# Patient Record
Sex: FEMALE | Race: WHITE | NOT HISPANIC OR LATINO | Employment: OTHER | URBAN - METROPOLITAN AREA
[De-identification: names, ages, dates, MRNs, and addresses within clinical notes are randomized per-mention and may not be internally consistent; named-entity substitution may affect disease eponyms.]

---

## 2023-08-02 ENCOUNTER — PRE-ADMISSION TESTING (OUTPATIENT)
Dept: PREADMISSION TESTING | Facility: HOSPITAL | Age: 73
End: 2023-08-02
Payer: MEDICARE

## 2023-08-02 VITALS — BODY MASS INDEX: 36.32 KG/M2 | WEIGHT: 212.74 LBS | HEIGHT: 64 IN

## 2023-08-02 LAB
DEPRECATED RDW RBC AUTO: 48 FL (ref 37–54)
ERYTHROCYTE [DISTWIDTH] IN BLOOD BY AUTOMATED COUNT: 12.9 % (ref 12.3–15.4)
HCT VFR BLD AUTO: 33.9 % (ref 34–46.6)
HGB BLD-MCNC: 10.6 G/DL (ref 12–15.9)
MCH RBC QN AUTO: 31.5 PG (ref 26.6–33)
MCHC RBC AUTO-ENTMCNC: 31.3 G/DL (ref 31.5–35.7)
MCV RBC AUTO: 100.9 FL (ref 79–97)
PLATELET # BLD AUTO: 202 10*3/MM3 (ref 140–450)
PMV BLD AUTO: 10.6 FL (ref 6–12)
POTASSIUM SERPL-SCNC: 4.7 MMOL/L (ref 3.5–5.2)
RBC # BLD AUTO: 3.36 10*6/MM3 (ref 3.77–5.28)
WBC NRBC COR # BLD: 4.9 10*3/MM3 (ref 3.4–10.8)

## 2023-08-02 PROCEDURE — 87081 CULTURE SCREEN ONLY: CPT

## 2023-08-02 PROCEDURE — 36415 COLL VENOUS BLD VENIPUNCTURE: CPT

## 2023-08-02 PROCEDURE — 84132 ASSAY OF SERUM POTASSIUM: CPT

## 2023-08-02 PROCEDURE — 85027 COMPLETE CBC AUTOMATED: CPT

## 2023-08-02 RX ORDER — MAGNESIUM OXIDE 400 MG/1
400 TABLET ORAL 2 TIMES DAILY
COMMUNITY

## 2023-08-02 RX ORDER — HYDROXYZINE HYDROCHLORIDE 25 MG/1
1 TABLET, FILM COATED ORAL 4 TIMES DAILY
COMMUNITY
Start: 2023-04-24

## 2023-08-02 RX ORDER — GABAPENTIN 300 MG/1
2 CAPSULE ORAL 3 TIMES DAILY
COMMUNITY
Start: 2023-02-14

## 2023-08-02 RX ORDER — GLIPIZIDE 10 MG/1
1 TABLET, FILM COATED, EXTENDED RELEASE ORAL 2 TIMES DAILY
COMMUNITY
Start: 2023-05-05

## 2023-08-02 RX ORDER — CARVEDILOL 25 MG/1
25 TABLET ORAL 2 TIMES DAILY WITH MEALS
COMMUNITY
Start: 2023-05-31

## 2023-08-02 RX ORDER — EZETIMIBE 10 MG/1
1 TABLET ORAL DAILY
COMMUNITY
Start: 2023-06-16

## 2023-08-02 RX ORDER — LISINOPRIL 40 MG/1
TABLET ORAL
COMMUNITY

## 2023-08-02 RX ORDER — PIOGLITAZONEHYDROCHLORIDE 45 MG/1
1 TABLET ORAL EVERY MORNING
COMMUNITY
Start: 2023-05-24

## 2023-08-02 NOTE — PAT
An arrival time for procedure was not provided during PAT visit. If patient had any questions or concerns about their arrival time, they were instructed to contact their surgeon/physician.  Additionally, if the patient referred to an arrival time that was acquired from their my chart account, patient was encouraged to verify that time with their surgeon/physician. Arrival times are NOT provided in Pre Admission Testing Department.    Patient viewed general PAT education video as instructed in their preoperative information received from their surgeon.  Patient stated the general PAT education video was viewed in its entirety and survey completed.  Copies of PAT general education handouts (Incentive Spirometry, Meds to Beds Program, Patient Belongings, Pre-op skin preparation instructions, Blood Glucose testing, Visitor policy, Surgery FAQ, Code H) distributed to patient if not printed. Education related to the PAT pass and skin preparation for surgery (if applicable) completed in PAT as a reinforcement to PAT education video. Patient instructed to return PAT pass provided today as well as completed skin preparation sheet (if applicable) on the day of procedure.     Additionally if patient had not viewed video yet but intended to view it at home or in our waiting area, then referred them to the handout with QR code/link provided during PAT visit.  Instructed patient to complete survey after viewing the video in its entirety.  Encouraged patient/family to read PAT general education handouts thoroughly and notify PAT staff with any questions or concerns. Patient verbalized understanding of all information and priority content.    Patient denies any current skin issues.     Patient to apply Chlorhexadine wipes  to surgical area (as instructed) the night before procedure and the AM of procedure. Wipes provided.    Patient instructed to drink 20 ounces of Gatorade and it needs to be completed 1 hour (for Main OR patients)  or 2 hours (scheduled  section & [facility] patients) before given arrival time for procedure (NO RED Gatorade)    Patient verbalized understanding.    Per Anesthesia Request, patient instructed not to take their ACE/ARB medications on the AM of surgery.    Post-Surgery Information Instruction Sheet given to patient during Pre-Admission Testing Visit with verbal instructions to patient to return with PAT PASS on the day of surgery. Additionally, encouraged patient to review the information provided.    PT REPORTED RECENT UTI AND ABX (KEFLEX) WITH LAST DOSE 23. PT STATED UTI SYMPTOMS HAVE RESOLVED. REPORTED ABOVE TO  FOR DR. FIGUEROA. DR. FIGUEROA'S OFFICE TO FOLLOW-UP WITH PT IF NEEDED.     EKG FROM 23 ON CHART. PT DENIES CP/SOA.

## 2023-08-04 LAB — MRSA SPEC QL CULT: NORMAL

## 2023-08-09 ENCOUNTER — ANESTHESIA (OUTPATIENT)
Dept: PERIOP | Facility: HOSPITAL | Age: 73
DRG: 454 | End: 2023-08-09
Payer: MEDICARE

## 2023-08-09 ENCOUNTER — ANESTHESIA EVENT (OUTPATIENT)
Dept: PERIOP | Facility: HOSPITAL | Age: 73
DRG: 454 | End: 2023-08-09
Payer: MEDICARE

## 2023-08-09 ENCOUNTER — APPOINTMENT (OUTPATIENT)
Dept: GENERAL RADIOLOGY | Facility: HOSPITAL | Age: 73
DRG: 454 | End: 2023-08-09
Payer: MEDICARE

## 2023-08-09 ENCOUNTER — HOSPITAL ENCOUNTER (INPATIENT)
Facility: HOSPITAL | Age: 73
LOS: 5 days | Discharge: NURSING, REHAB OR LONG-TERM CARE | DRG: 454 | End: 2023-08-14
Attending: ORTHOPAEDIC SURGERY | Admitting: ORTHOPAEDIC SURGERY
Payer: MEDICARE

## 2023-08-09 DIAGNOSIS — Z98.1 S/P LUMBAR FUSION: Primary | ICD-10-CM

## 2023-08-09 PROBLEM — E11.9 DIABETES: Status: ACTIVE | Noted: 2023-08-09

## 2023-08-09 PROBLEM — M54.9 BACK PAIN: Status: ACTIVE | Noted: 2023-08-09

## 2023-08-09 PROBLEM — I10 HYPERTENSION: Status: ACTIVE | Noted: 2023-08-09

## 2023-08-09 LAB
GLUCOSE BLDC GLUCOMTR-MCNC: 154 MG/DL (ref 70–130)
GLUCOSE BLDC GLUCOMTR-MCNC: 218 MG/DL (ref 70–130)
GLUCOSE BLDC GLUCOMTR-MCNC: 236 MG/DL (ref 70–130)
GLUCOSE BLDC GLUCOMTR-MCNC: 239 MG/DL (ref 70–130)
GLUCOSE BLDC GLUCOMTR-MCNC: 318 MG/DL (ref 70–130)

## 2023-08-09 PROCEDURE — C1713 ANCHOR/SCREW BN/BN,TIS/BN: HCPCS | Performed by: ORTHOPAEDIC SURGERY

## 2023-08-09 PROCEDURE — 25010000002 CEFAZOLIN IN DEXTROSE 2-4 GM/100ML-% SOLUTION: Performed by: ORTHOPAEDIC SURGERY

## 2023-08-09 PROCEDURE — 25010000002 CEFAZOLIN IN DEXTROSE 2000 MG/ 100 ML SOLUTION: Performed by: ORTHOPAEDIC SURGERY

## 2023-08-09 PROCEDURE — 0SG1071 FUSION OF 2 OR MORE LUMBAR VERTEBRAL JOINTS WITH AUTOLOGOUS TISSUE SUBSTITUTE, POSTERIOR APPROACH, POSTERIOR COLUMN, OPEN APPROACH: ICD-10-PCS | Performed by: ORTHOPAEDIC SURGERY

## 2023-08-09 PROCEDURE — 25010000002 DEXAMETHASONE PER 1 MG: Performed by: ANESTHESIOLOGY

## 2023-08-09 PROCEDURE — 25010000002 FENTANYL CITRATE (PF) 50 MCG/ML SOLUTION

## 2023-08-09 PROCEDURE — 82948 REAGENT STRIP/BLOOD GLUCOSE: CPT

## 2023-08-09 PROCEDURE — 25010000002 PHENYLEPHRINE 10 MG/ML SOLUTION 1 ML VIAL: Performed by: ANESTHESIOLOGY

## 2023-08-09 PROCEDURE — 25010000002 PROPOFOL 10 MG/ML EMULSION: Performed by: ANESTHESIOLOGY

## 2023-08-09 PROCEDURE — 25010000002 SUGAMMADEX 200 MG/2ML SOLUTION: Performed by: NURSE ANESTHETIST, CERTIFIED REGISTERED

## 2023-08-09 PROCEDURE — 63710000001 INSULIN LISPRO (HUMAN) PER 5 UNITS: Performed by: NURSE PRACTITIONER

## 2023-08-09 PROCEDURE — 76000 FLUOROSCOPY <1 HR PHYS/QHP: CPT

## 2023-08-09 PROCEDURE — 63710000001 INSULIN LISPRO (HUMAN) PER 5 UNITS

## 2023-08-09 PROCEDURE — 0ST20ZZ RESECTION OF LUMBAR VERTEBRAL DISC, OPEN APPROACH: ICD-10-PCS | Performed by: ORTHOPAEDIC SURGERY

## 2023-08-09 PROCEDURE — 25010000002 FENTANYL CITRATE (PF) 100 MCG/2ML SOLUTION: Performed by: ANESTHESIOLOGY

## 2023-08-09 PROCEDURE — 25010000002 ONDANSETRON PER 1 MG: Performed by: ANESTHESIOLOGY

## 2023-08-09 PROCEDURE — 0SG10AJ FUSION OF 2 OR MORE LUMBAR VERTEBRAL JOINTS WITH INTERBODY FUSION DEVICE, POSTERIOR APPROACH, ANTERIOR COLUMN, OPEN APPROACH: ICD-10-PCS | Performed by: ORTHOPAEDIC SURGERY

## 2023-08-09 PROCEDURE — 25010000002 SODIUM CHLORIDE 0.9 % WITH KCL 20 MEQ 20-0.9 MEQ/L-% SOLUTION: Performed by: ORTHOPAEDIC SURGERY

## 2023-08-09 DEVICE — ALLOGRFT BONE VIVIGEN CELLUAR MATRX FORMABLE 10CC: Type: IMPLANTABLE DEVICE | Site: SPINE LUMBAR | Status: FUNCTIONAL

## 2023-08-09 DEVICE — IMPLANTABLE DEVICE: Type: IMPLANTABLE DEVICE | Site: SPINE LUMBAR | Status: FUNCTIONAL

## 2023-08-09 DEVICE — SPACR OPAL 10X12X28MM: Type: IMPLANTABLE DEVICE | Site: SPINE LUMBAR | Status: FUNCTIONAL

## 2023-08-09 DEVICE — IMPLANTABLE DEVICE
Type: IMPLANTABLE DEVICE | Site: SPINE LUMBAR | Status: FUNCTIONAL
Brand: SURGIFOAM® ABSORBABLE GELATIN SPONGE, U.S.P.

## 2023-08-09 DEVICE — DELIVERY SYSTEM CANNULA
Type: IMPLANTABLE DEVICE | Site: SPINE LUMBAR | Status: FUNCTIONAL
Brand: VIVIGEN MIS DELIVERY SYSTEM

## 2023-08-09 DEVICE — SCRW CORT VIPER FEN PA TI 6X40MM: Type: IMPLANTABLE DEVICE | Site: SPINE LUMBAR | Status: FUNCTIONAL

## 2023-08-09 DEVICE — FLOSEAL WITH RECOTHROM - 10ML.
Type: IMPLANTABLE DEVICE | Site: SPINE LUMBAR | Status: FUNCTIONAL
Brand: FLOSEAL HEMOSTATIC MATRIX

## 2023-08-09 DEVICE — SCRW VIPER INNR ST: Type: IMPLANTABLE DEVICE | Site: SPINE LUMBAR | Status: FUNCTIONAL

## 2023-08-09 RX ORDER — OXYCODONE AND ACETAMINOPHEN 10; 325 MG/1; MG/1
TABLET ORAL
Status: ACTIVE
Start: 2023-08-09 | End: 2023-08-10

## 2023-08-09 RX ORDER — EPHEDRINE SULFATE 50 MG/ML
INJECTION INTRAVENOUS AS NEEDED
Status: DISCONTINUED | OUTPATIENT
Start: 2023-08-09 | End: 2023-08-09 | Stop reason: SURG

## 2023-08-09 RX ORDER — ONDANSETRON 2 MG/ML
4 INJECTION INTRAMUSCULAR; INTRAVENOUS ONCE AS NEEDED
Status: DISCONTINUED | OUTPATIENT
Start: 2023-08-09 | End: 2023-08-09 | Stop reason: HOSPADM

## 2023-08-09 RX ORDER — SODIUM CHLORIDE 9 MG/ML
40 INJECTION, SOLUTION INTRAVENOUS AS NEEDED
Status: DISCONTINUED | OUTPATIENT
Start: 2023-08-09 | End: 2023-08-09 | Stop reason: HOSPADM

## 2023-08-09 RX ORDER — FAMOTIDINE 20 MG/1
20 TABLET, FILM COATED ORAL ONCE
Status: COMPLETED | OUTPATIENT
Start: 2023-08-09 | End: 2023-08-09

## 2023-08-09 RX ORDER — METHOCARBAMOL 500 MG/1
1000 TABLET, FILM COATED ORAL 4 TIMES DAILY PRN
Status: DISCONTINUED | OUTPATIENT
Start: 2023-08-09 | End: 2023-08-14 | Stop reason: HOSPADM

## 2023-08-09 RX ORDER — SODIUM CHLORIDE 0.9 % (FLUSH) 0.9 %
3-10 SYRINGE (ML) INJECTION AS NEEDED
Status: DISCONTINUED | OUTPATIENT
Start: 2023-08-09 | End: 2023-08-14 | Stop reason: HOSPADM

## 2023-08-09 RX ORDER — EPHEDRINE SULFATE 50 MG/ML
5 INJECTION, SOLUTION INTRAVENOUS ONCE AS NEEDED
Status: DISCONTINUED | OUTPATIENT
Start: 2023-08-09 | End: 2023-08-09 | Stop reason: HOSPADM

## 2023-08-09 RX ORDER — CEFAZOLIN SODIUM 2 G/100ML
2000 INJECTION, SOLUTION INTRAVENOUS ONCE
Status: DISCONTINUED | OUTPATIENT
Start: 2023-08-09 | End: 2023-08-09 | Stop reason: SDUPTHER

## 2023-08-09 RX ORDER — INSULIN LISPRO 100 [IU]/ML
INJECTION, SOLUTION INTRAVENOUS; SUBCUTANEOUS
Status: COMPLETED
Start: 2023-08-09 | End: 2023-08-09

## 2023-08-09 RX ORDER — NICOTINE POLACRILEX 4 MG
15 LOZENGE BUCCAL
Status: DISCONTINUED | OUTPATIENT
Start: 2023-08-09 | End: 2023-08-14 | Stop reason: HOSPADM

## 2023-08-09 RX ORDER — SODIUM CHLORIDE 9 MG/ML
40 INJECTION, SOLUTION INTRAVENOUS AS NEEDED
Status: DISCONTINUED | OUTPATIENT
Start: 2023-08-09 | End: 2023-08-14 | Stop reason: HOSPADM

## 2023-08-09 RX ORDER — ROCURONIUM BROMIDE 10 MG/ML
INJECTION, SOLUTION INTRAVENOUS AS NEEDED
Status: DISCONTINUED | OUTPATIENT
Start: 2023-08-09 | End: 2023-08-09 | Stop reason: SURG

## 2023-08-09 RX ORDER — FAMOTIDINE 10 MG/ML
20 INJECTION, SOLUTION INTRAVENOUS EVERY 12 HOURS SCHEDULED
Status: DISCONTINUED | OUTPATIENT
Start: 2023-08-09 | End: 2023-08-14

## 2023-08-09 RX ORDER — NALOXONE HCL 0.4 MG/ML
0.4 VIAL (ML) INJECTION
Status: DISCONTINUED | OUTPATIENT
Start: 2023-08-09 | End: 2023-08-14 | Stop reason: HOSPADM

## 2023-08-09 RX ORDER — SODIUM CHLORIDE, SODIUM LACTATE, POTASSIUM CHLORIDE, CALCIUM CHLORIDE 600; 310; 30; 20 MG/100ML; MG/100ML; MG/100ML; MG/100ML
100 INJECTION, SOLUTION INTRAVENOUS CONTINUOUS
Status: DISCONTINUED | OUTPATIENT
Start: 2023-08-09 | End: 2023-08-14 | Stop reason: HOSPADM

## 2023-08-09 RX ORDER — FENTANYL CITRATE 50 UG/ML
INJECTION, SOLUTION INTRAMUSCULAR; INTRAVENOUS AS NEEDED
Status: DISCONTINUED | OUTPATIENT
Start: 2023-08-09 | End: 2023-08-09 | Stop reason: SURG

## 2023-08-09 RX ORDER — IBUPROFEN 600 MG/1
1 TABLET ORAL
Status: DISCONTINUED | OUTPATIENT
Start: 2023-08-09 | End: 2023-08-14 | Stop reason: HOSPADM

## 2023-08-09 RX ORDER — DEXTROSE MONOHYDRATE 25 G/50ML
25 INJECTION, SOLUTION INTRAVENOUS
Status: DISCONTINUED | OUTPATIENT
Start: 2023-08-09 | End: 2023-08-14 | Stop reason: HOSPADM

## 2023-08-09 RX ORDER — INSULIN LISPRO 100 [IU]/ML
INJECTION, SOLUTION INTRAVENOUS; SUBCUTANEOUS
Status: ACTIVE
Start: 2023-08-09 | End: 2023-08-10

## 2023-08-09 RX ORDER — HYDROMORPHONE HYDROCHLORIDE 1 MG/ML
0.5 INJECTION, SOLUTION INTRAMUSCULAR; INTRAVENOUS; SUBCUTANEOUS
Status: DISCONTINUED | OUTPATIENT
Start: 2023-08-09 | End: 2023-08-09 | Stop reason: HOSPADM

## 2023-08-09 RX ORDER — HYDROCODONE BITARTRATE AND ACETAMINOPHEN 7.5; 325 MG/1; MG/1
1 TABLET ORAL EVERY 4 HOURS PRN
Status: DISCONTINUED | OUTPATIENT
Start: 2023-08-09 | End: 2023-08-14

## 2023-08-09 RX ORDER — ACETAMINOPHEN 500 MG
1000 TABLET ORAL ONCE
Status: COMPLETED | OUTPATIENT
Start: 2023-08-09 | End: 2023-08-09

## 2023-08-09 RX ORDER — CEFAZOLIN SODIUM 2 G/100ML
2000 INJECTION, SOLUTION INTRAVENOUS ONCE
Status: COMPLETED | OUTPATIENT
Start: 2023-08-09 | End: 2023-08-09

## 2023-08-09 RX ORDER — INSULIN LISPRO 100 [IU]/ML
2-7 INJECTION, SOLUTION INTRAVENOUS; SUBCUTANEOUS
Status: DISCONTINUED | OUTPATIENT
Start: 2023-08-09 | End: 2023-08-14 | Stop reason: HOSPADM

## 2023-08-09 RX ORDER — HYDROXYZINE HYDROCHLORIDE 25 MG/1
TABLET, FILM COATED ORAL
Status: DISCONTINUED
Start: 2023-08-09 | End: 2023-08-10 | Stop reason: WASHOUT

## 2023-08-09 RX ORDER — HYDROXYZINE HYDROCHLORIDE 25 MG/1
25 TABLET, FILM COATED ORAL 4 TIMES DAILY
Status: DISCONTINUED | OUTPATIENT
Start: 2023-08-09 | End: 2023-08-14 | Stop reason: HOSPADM

## 2023-08-09 RX ORDER — FAMOTIDINE 20 MG/1
20 TABLET, FILM COATED ORAL EVERY 12 HOURS SCHEDULED
Status: DISCONTINUED | OUTPATIENT
Start: 2023-08-09 | End: 2023-08-14 | Stop reason: HOSPADM

## 2023-08-09 RX ORDER — BUPIVACAINE HYDROCHLORIDE AND EPINEPHRINE 2.5; 5 MG/ML; UG/ML
INJECTION, SOLUTION INFILTRATION; PERINEURAL AS NEEDED
Status: DISCONTINUED | OUTPATIENT
Start: 2023-08-09 | End: 2023-08-09 | Stop reason: HOSPADM

## 2023-08-09 RX ORDER — ACETAMINOPHEN 325 MG/1
650 TABLET ORAL EVERY 4 HOURS PRN
Status: DISCONTINUED | OUTPATIENT
Start: 2023-08-09 | End: 2023-08-14 | Stop reason: HOSPADM

## 2023-08-09 RX ORDER — SODIUM CHLORIDE, SODIUM LACTATE, POTASSIUM CHLORIDE, CALCIUM CHLORIDE 600; 310; 30; 20 MG/100ML; MG/100ML; MG/100ML; MG/100ML
9 INJECTION, SOLUTION INTRAVENOUS CONTINUOUS
Status: DISCONTINUED | OUTPATIENT
Start: 2023-08-09 | End: 2023-08-14 | Stop reason: HOSPADM

## 2023-08-09 RX ORDER — CEFAZOLIN SODIUM 2 G/100ML
2000 INJECTION, SOLUTION INTRAVENOUS EVERY 8 HOURS
Status: COMPLETED | OUTPATIENT
Start: 2023-08-09 | End: 2023-08-10

## 2023-08-09 RX ORDER — PROPOFOL 10 MG/ML
VIAL (ML) INTRAVENOUS AS NEEDED
Status: DISCONTINUED | OUTPATIENT
Start: 2023-08-09 | End: 2023-08-09 | Stop reason: SURG

## 2023-08-09 RX ORDER — FENTANYL CITRATE 50 UG/ML
50 INJECTION, SOLUTION INTRAMUSCULAR; INTRAVENOUS
Status: DISCONTINUED | OUTPATIENT
Start: 2023-08-09 | End: 2023-08-09 | Stop reason: HOSPADM

## 2023-08-09 RX ORDER — FAMOTIDINE 10 MG/ML
20 INJECTION, SOLUTION INTRAVENOUS ONCE
Status: DISCONTINUED | OUTPATIENT
Start: 2023-08-09 | End: 2023-08-09 | Stop reason: HOSPADM

## 2023-08-09 RX ORDER — LIDOCAINE HYDROCHLORIDE 10 MG/ML
INJECTION, SOLUTION EPIDURAL; INFILTRATION; INTRACAUDAL; PERINEURAL AS NEEDED
Status: DISCONTINUED | OUTPATIENT
Start: 2023-08-09 | End: 2023-08-09 | Stop reason: SURG

## 2023-08-09 RX ORDER — BISACODYL 10 MG
10 SUPPOSITORY, RECTAL RECTAL DAILY PRN
Status: DISCONTINUED | OUTPATIENT
Start: 2023-08-09 | End: 2023-08-14 | Stop reason: HOSPADM

## 2023-08-09 RX ORDER — SODIUM CHLORIDE 0.9 % (FLUSH) 0.9 %
10 SYRINGE (ML) INJECTION AS NEEDED
Status: DISCONTINUED | OUTPATIENT
Start: 2023-08-09 | End: 2023-08-09 | Stop reason: HOSPADM

## 2023-08-09 RX ORDER — LABETALOL HYDROCHLORIDE 5 MG/ML
10 INJECTION, SOLUTION INTRAVENOUS EVERY 4 HOURS PRN
Status: DISCONTINUED | OUTPATIENT
Start: 2023-08-09 | End: 2023-08-14 | Stop reason: HOSPADM

## 2023-08-09 RX ORDER — PHENYLEPHRINE HCL IN 0.9% NACL 1 MG/10 ML
SYRINGE (ML) INTRAVENOUS AS NEEDED
Status: DISCONTINUED | OUTPATIENT
Start: 2023-08-09 | End: 2023-08-09 | Stop reason: SURG

## 2023-08-09 RX ORDER — DEXAMETHASONE SODIUM PHOSPHATE 4 MG/ML
INJECTION, SOLUTION INTRA-ARTICULAR; INTRALESIONAL; INTRAMUSCULAR; INTRAVENOUS; SOFT TISSUE AS NEEDED
Status: DISCONTINUED | OUTPATIENT
Start: 2023-08-09 | End: 2023-08-09 | Stop reason: SURG

## 2023-08-09 RX ORDER — MAGNESIUM HYDROXIDE 1200 MG/15ML
LIQUID ORAL AS NEEDED
Status: DISCONTINUED | OUTPATIENT
Start: 2023-08-09 | End: 2023-08-09 | Stop reason: HOSPADM

## 2023-08-09 RX ORDER — ONDANSETRON 2 MG/ML
INJECTION INTRAMUSCULAR; INTRAVENOUS AS NEEDED
Status: DISCONTINUED | OUTPATIENT
Start: 2023-08-09 | End: 2023-08-09 | Stop reason: SURG

## 2023-08-09 RX ORDER — ONDANSETRON 2 MG/ML
4 INJECTION INTRAMUSCULAR; INTRAVENOUS EVERY 6 HOURS PRN
Status: DISCONTINUED | OUTPATIENT
Start: 2023-08-09 | End: 2023-08-14 | Stop reason: HOSPADM

## 2023-08-09 RX ORDER — DOCUSATE SODIUM 100 MG/1
100 CAPSULE, LIQUID FILLED ORAL 2 TIMES DAILY PRN
Status: DISCONTINUED | OUTPATIENT
Start: 2023-08-09 | End: 2023-08-14 | Stop reason: HOSPADM

## 2023-08-09 RX ORDER — PREGABALIN 75 MG/1
75 CAPSULE ORAL ONCE
Status: COMPLETED | OUTPATIENT
Start: 2023-08-09 | End: 2023-08-09

## 2023-08-09 RX ORDER — INSULIN LISPRO 100 [IU]/ML
4 INJECTION, SOLUTION INTRAVENOUS; SUBCUTANEOUS ONCE
Status: COMPLETED | OUTPATIENT
Start: 2023-08-09 | End: 2023-08-09

## 2023-08-09 RX ORDER — OXYCODONE HCL 10 MG/1
10 TABLET, FILM COATED, EXTENDED RELEASE ORAL ONCE
Status: COMPLETED | OUTPATIENT
Start: 2023-08-09 | End: 2023-08-09

## 2023-08-09 RX ORDER — FAMOTIDINE 20 MG/1
TABLET, FILM COATED ORAL
Status: ACTIVE
Start: 2023-08-09 | End: 2023-08-10

## 2023-08-09 RX ORDER — BISACODYL 5 MG/1
5 TABLET, DELAYED RELEASE ORAL DAILY PRN
Status: DISCONTINUED | OUTPATIENT
Start: 2023-08-09 | End: 2023-08-14 | Stop reason: HOSPADM

## 2023-08-09 RX ORDER — NALOXONE HCL 0.4 MG/ML
0.4 VIAL (ML) INJECTION AS NEEDED
Status: DISCONTINUED | OUTPATIENT
Start: 2023-08-09 | End: 2023-08-09 | Stop reason: HOSPADM

## 2023-08-09 RX ORDER — SODIUM CHLORIDE AND POTASSIUM CHLORIDE 150; 900 MG/100ML; MG/100ML
100 INJECTION, SOLUTION INTRAVENOUS CONTINUOUS
Status: DISCONTINUED | OUTPATIENT
Start: 2023-08-09 | End: 2023-08-10

## 2023-08-09 RX ORDER — LISINOPRIL 40 MG/1
40 TABLET ORAL
Status: DISCONTINUED | OUTPATIENT
Start: 2023-08-09 | End: 2023-08-11

## 2023-08-09 RX ORDER — LIDOCAINE HYDROCHLORIDE 10 MG/ML
0.5 INJECTION, SOLUTION EPIDURAL; INFILTRATION; INTRACAUDAL; PERINEURAL ONCE AS NEEDED
Status: COMPLETED | OUTPATIENT
Start: 2023-08-09 | End: 2023-08-09

## 2023-08-09 RX ORDER — FENTANYL CITRATE 50 UG/ML
INJECTION, SOLUTION INTRAMUSCULAR; INTRAVENOUS
Status: COMPLETED
Start: 2023-08-09 | End: 2023-08-09

## 2023-08-09 RX ORDER — MORPHINE SULFATE 1 MG/ML
1 INJECTION, SOLUTION EPIDURAL; INTRATHECAL; INTRAVENOUS EVERY 4 HOURS PRN
Status: DISCONTINUED | OUTPATIENT
Start: 2023-08-09 | End: 2023-08-14 | Stop reason: HOSPADM

## 2023-08-09 RX ORDER — OXYCODONE AND ACETAMINOPHEN 10; 325 MG/1; MG/1
1 TABLET ORAL EVERY 4 HOURS PRN
Status: DISCONTINUED | OUTPATIENT
Start: 2023-08-09 | End: 2023-08-14 | Stop reason: HOSPADM

## 2023-08-09 RX ORDER — CARVEDILOL 12.5 MG/1
25 TABLET ORAL 2 TIMES DAILY WITH MEALS
Status: DISCONTINUED | OUTPATIENT
Start: 2023-08-09 | End: 2023-08-11

## 2023-08-09 RX ORDER — SODIUM CHLORIDE 0.9 % (FLUSH) 0.9 %
10 SYRINGE (ML) INJECTION EVERY 12 HOURS SCHEDULED
Status: DISCONTINUED | OUTPATIENT
Start: 2023-08-09 | End: 2023-08-09 | Stop reason: HOSPADM

## 2023-08-09 RX ORDER — SODIUM CHLORIDE 0.9 % (FLUSH) 0.9 %
3 SYRINGE (ML) INJECTION EVERY 12 HOURS SCHEDULED
Status: DISCONTINUED | OUTPATIENT
Start: 2023-08-09 | End: 2023-08-14 | Stop reason: HOSPADM

## 2023-08-09 RX ADMIN — OXYCODONE HYDROCHLORIDE 10 MG: 10 TABLET, FILM COATED, EXTENDED RELEASE ORAL at 07:04

## 2023-08-09 RX ADMIN — SODIUM CHLORIDE, POTASSIUM CHLORIDE, SODIUM LACTATE AND CALCIUM CHLORIDE 9 ML/HR: 600; 310; 30; 20 INJECTION, SOLUTION INTRAVENOUS at 07:04

## 2023-08-09 RX ADMIN — Medication 100 MCG: at 07:56

## 2023-08-09 RX ADMIN — PHENYLEPHRINE HYDROCHLORIDE 0.25 MG: 10 INJECTION INTRAVENOUS at 09:04

## 2023-08-09 RX ADMIN — INSULIN LISPRO 4 UNITS: 100 INJECTION, SOLUTION INTRAVENOUS; SUBCUTANEOUS at 15:04

## 2023-08-09 RX ADMIN — EPHEDRINE SULFATE 5 MG: 50 INJECTION, SOLUTION INTRAVENOUS at 08:05

## 2023-08-09 RX ADMIN — PROPOFOL 25 MCG/KG/MIN: 10 INJECTION, EMULSION INTRAVENOUS at 08:00

## 2023-08-09 RX ADMIN — LIDOCAINE HYDROCHLORIDE 0.5 ML: 10 INJECTION, SOLUTION EPIDURAL; INFILTRATION; INTRACAUDAL; PERINEURAL at 07:02

## 2023-08-09 RX ADMIN — Medication 100 MCG: at 08:47

## 2023-08-09 RX ADMIN — ROCURONIUM BROMIDE 10 MG: 10 SOLUTION INTRAVENOUS at 10:15

## 2023-08-09 RX ADMIN — LIDOCAINE HYDROCHLORIDE 50 MG: 10 INJECTION, SOLUTION EPIDURAL; INFILTRATION; INTRACAUDAL; PERINEURAL at 07:40

## 2023-08-09 RX ADMIN — ROCURONIUM BROMIDE 50 MG: 10 SOLUTION INTRAVENOUS at 07:41

## 2023-08-09 RX ADMIN — CEFAZOLIN SODIUM 2000 MG: 2 INJECTION, SOLUTION INTRAVENOUS at 11:44

## 2023-08-09 RX ADMIN — FENTANYL CITRATE 50 MCG: 50 INJECTION, SOLUTION INTRAMUSCULAR; INTRAVENOUS at 13:03

## 2023-08-09 RX ADMIN — PROPOFOL 200 MG: 10 INJECTION, EMULSION INTRAVENOUS at 07:40

## 2023-08-09 RX ADMIN — HYDROCODONE BITARTRATE AND ACETAMINOPHEN 1 TABLET: 7.5; 325 TABLET ORAL at 18:15

## 2023-08-09 RX ADMIN — INSULIN LISPRO 3 UNITS: 100 INJECTION, SOLUTION INTRAVENOUS; SUBCUTANEOUS at 21:20

## 2023-08-09 RX ADMIN — Medication 100 MCG: at 08:05

## 2023-08-09 RX ADMIN — EPHEDRINE SULFATE 5 MG: 50 INJECTION, SOLUTION INTRAVENOUS at 08:01

## 2023-08-09 RX ADMIN — PREGABALIN 75 MG: 75 CAPSULE ORAL at 07:02

## 2023-08-09 RX ADMIN — FENTANYL CITRATE 100 MCG: 50 INJECTION, SOLUTION INTRAMUSCULAR; INTRAVENOUS at 07:40

## 2023-08-09 RX ADMIN — CEFAZOLIN SODIUM 2000 MG: 2 INJECTION, SOLUTION INTRAVENOUS at 07:47

## 2023-08-09 RX ADMIN — FAMOTIDINE 20 MG: 20 TABLET ORAL at 07:06

## 2023-08-09 RX ADMIN — INSULIN LISPRO 3 UNITS: 100 INJECTION, SOLUTION INTRAVENOUS; SUBCUTANEOUS at 18:14

## 2023-08-09 RX ADMIN — ACETAMINOPHEN 1000 MG: 500 TABLET ORAL at 07:02

## 2023-08-09 RX ADMIN — Medication 100 MCG: at 08:56

## 2023-08-09 RX ADMIN — FAMOTIDINE 20 MG: 20 TABLET ORAL at 21:20

## 2023-08-09 RX ADMIN — OXYCODONE HYDROCHLORIDE AND ACETAMINOPHEN 1 TABLET: 10; 325 TABLET ORAL at 21:30

## 2023-08-09 RX ADMIN — EPHEDRINE SULFATE 5 MG: 50 INJECTION, SOLUTION INTRAVENOUS at 07:59

## 2023-08-09 RX ADMIN — CEFAZOLIN SODIUM 2000 MG: 2 INJECTION, SOLUTION INTRAVENOUS at 21:18

## 2023-08-09 RX ADMIN — POTASSIUM CHLORIDE AND SODIUM CHLORIDE 100 ML/HR: 900; 150 INJECTION, SOLUTION INTRAVENOUS at 17:05

## 2023-08-09 RX ADMIN — SUGAMMADEX 200 MG: 100 INJECTION, SOLUTION INTRAVENOUS at 12:10

## 2023-08-09 RX ADMIN — DEXAMETHASONE SODIUM PHOSPHATE 4 MG: 4 INJECTION, SOLUTION INTRAMUSCULAR; INTRAVENOUS at 07:45

## 2023-08-09 RX ADMIN — EPHEDRINE SULFATE 5 MG: 50 INJECTION, SOLUTION INTRAVENOUS at 08:47

## 2023-08-09 RX ADMIN — ONDANSETRON 4 MG: 2 INJECTION INTRAMUSCULAR; INTRAVENOUS at 11:42

## 2023-08-09 RX ADMIN — HYDROXYZINE HYDROCHLORIDE 25 MG: 25 TABLET, FILM COATED ORAL at 18:15

## 2023-08-09 RX ADMIN — POTASSIUM CHLORIDE AND SODIUM CHLORIDE 100 ML/HR: 900; 150 INJECTION, SOLUTION INTRAVENOUS at 17:57

## 2023-08-09 RX ADMIN — ROCURONIUM BROMIDE 20 MG: 10 SOLUTION INTRAVENOUS at 08:40

## 2023-08-09 NOTE — ANESTHESIA PROCEDURE NOTES
Airway  Urgency: elective    Date/Time: 8/9/2023 7:43 AM  Airway not difficult    General Information and Staff    Patient location during procedure: OR  SRNA: Benita Guerra SRNA  Indications and Patient Condition  Indications for airway management: airway protection    Preoxygenated: yes  MILS maintained throughout  Mask difficulty assessment: 2 - vent by mask + OA or adjuvant +/- NMBA    Final Airway Details  Final airway type: endotracheal airway      Successful airway: ETT  Cuffed: yes   Successful intubation technique: direct laryngoscopy  Facilitating devices/methods: intubating stylet  Endotracheal tube insertion site: oral  Blade: Kerns  Blade size: 2  ETT size (mm): 7.0  Cormack-Lehane Classification: grade I - full view of glottis  Placement verified by: chest auscultation and capnometry   Cuff volume (mL): 8  Measured from: lips  ETT/EBT  to lips (cm): 22  Number of attempts at approach: 1  Assessment: lips, teeth, and gum same as pre-op and atraumatic intubation    Additional Comments  Negative epigastric sounds, Breath sound equal bilaterally with symmetric chest rise and fall

## 2023-08-09 NOTE — H&P
[employer], [employer]  Physician     H&P      Signed     Date of Service: 08/09/23 0000  Creation Time: 07/26/23 1229     Associated Documents  Scan on 8/9/2023 by [employer], [employer]: H&P, NEW [facility], 07/19/2023         Last signed by: [employer], [employer] at 07/26/23 1229      [facility] Pre-op    Full history and physical note from office is up to date.     There were no vitals taken for this visit.  Patient denies allergy to contrast dye or latex  IMM:  Influenza: No  Pneumococcal: No  Tetanus: Up-to-date  Lungs: Clear to auscultation bilaterally to the bases  Cardiovascular: S1-S2 without rubs murmurs or gallops  Abdomen: Soft, nontender, bowel sounds present throughout.    LAB Results:  Lab Results   Component Value Date    WBC 4.90 08/02/2023    HGB 10.6 (L) 08/02/2023    HCT 33.9 (L) 08/02/2023    .9 (H) 08/02/2023     08/02/2023    K 4.7 08/02/2023       Cancer Staging (if applicable)  Cancer Patient: __ yes __no __unknown__N/A; If yes, clinical stage T:__ N:__M:__, stage group or __N/A  Impression: Left lower extremity radiculopathy  Low back pain  Obesity  Impression: Diabetes mellitus  Hypertension  Hyperlipidemia  Plan: Lumbar decompression and fusion  ALETA Lyon   08/09/23   7:02 AM EDT

## 2023-08-09 NOTE — ANESTHESIA POSTPROCEDURE EVALUATION
Patient: Luna Mark    Procedure Summary       Date: 08/09/23 Room / Location:  [facility]  [facility]    Anesthesia Start: 0737 Anesthesia Stop: 1226    Procedure: l3-4, l4-5 laminectomy spinal fusion (Spine Lumbar) Diagnosis:     Surgeons: Darío Pickering MD Provider: Ricky Blackman MD    Anesthesia Type: general ASA Status: 3            Anesthesia Type: general    Vitals  Vitals Value Taken Time   BP     Temp     Pulse 74 08/09/23 1224   Resp     SpO2 100 % 08/09/23 1224   Vitals shown include unvalidated device data.        Post Anesthesia Care and Evaluation    Patient location during evaluation: PACU  Patient participation: complete - patient participated  Level of consciousness: sleepy but conscious  Pain score: 0  Pain management: adequate    Airway patency: patent  Anesthetic complications: No anesthetic complications  PONV Status: none  Cardiovascular status: hemodynamically stable and acceptable  Respiratory status: nonlabored ventilation, acceptable and nasal cannula  Hydration status: acceptable

## 2023-08-09 NOTE — OP NOTE
PREOPERATIVE DIAGNOSES:   L3-L4 and L4-L5 lumbar spinal stenosis.   Degenerative spondylolisthesis.     POSTOPERATIVE DIAGNOSES:  L3-L4 and L4-L5 lumbar spinal stenosis.   Degenerative spondylolisthesis.     PROCEDURES PERFORMED:   Decompressive laminectomy, medial facetectomy, and foraminotomy L3-L4 and L4-L5 to decompress neural elements.   Segmental instrumentation L3-L4-L5 with DePuy transpedicular fixation.   L3-L4 and L4-L5 transforaminal lumbar interbody fusion with interbody fusion cage and bone graft.   Posterolateral fusion L3-L4-L5 with bone graft.   Harvesting bone graft locally from spinous processes and lamina.   Use of ViviGen allograft.     SURGEON: Darío Pickering MD     ASSISTANT: Salome Jordan PA-C  (Ms. Jordan's assistance was required for patient positioning, surgical approach, neural decompression, instrumentation and wound closure.)     ANESTHESIA: General.     ESTIMATED BLOOD LOSS: 800 mL.     DESCRIPTION OF PROCEDURE: The patient was given a preoperative intravenous dose of Ancef. She was given a general anesthetic. She was placed in the prone position on the Solo table. The back was prepped and draped sterilely.     A midline incision was used. The fascia was split and paraspinal musculature elevated. I identified L3-L4 and L4-L5 by C-arm.    Instrumentation was performed first. Under C-arm guidance I placed pedicle screws bilaterally at L3, L4, and L5. Screws obtained good bony purchase. They appeared in good position under C-arm. Two rods contoured into lordosis were placed on the screws and the appropriate set screws applied.     Neural decompression was performed. I started at L4-L5. There was a severe central stenosis. Midline laminectomy was performed using Leksell rongeurs and Kerrison rongeurs. There was a severe central stenosis at L4-L5. Flavectomy was performed. Bilateral medial facetectomies were performed to decompress the lateral recesses. Foraminotomies were performed. The  neural elements at L4-L5 appeared decompressed.     I moved cephalad to L3-L4. A midline laminectomy was performed of L3. Again there was a severe central stenosis. Ligamentum flavum was excised.  Bilateral medial facetectomies were performed. Foraminotomies were performed. The neural elements at L3-L4 appeared decompressed. At this point the dural sac was widely expanded and noncompressed from L3-L4 down to L4-L5.     Transforaminal lumbar interbody fusion was then performed. I did this first at L4-L5. This was done through the left side. An annular window was created. A complete diskectomy was performed using pituitaries, curets and endplate jose. The disc was very degenerated. The cage used at L4-L5 was a DePuy/Synthes interbody fusion cage. The size used was 12 mm in height and 28 in length. Prior to placing this cage I packed bone graft in the anterior disc space. I then packed the cage with bone graft. The cage was then impacted tangentially across the disc space and locked into place well. It appeared in good position on C-arm.     I moved cephalad to L3-L4. An interbody fusion was also performed at this level. An annular window was created on the left side between the exiting nerve root above and the traversing nerve root medially. A complete diskectomy was performed. The appropriate size cage at L3-L4 was 12 mm in height also. Prior to placing the cage I packed bone graft in the anterior disc space. The interbody fusion cage was packed with bone graft. It was then impacted tangentially across the disc space. Looked in good position on C-arm. At this point I applied compression across the screws and tightened the set screws to lock the cages in place. The cages and the construct appeared solid.     Posterolateral fusion was performed. The transverse processes of L3, L4 and L5 were decorticated. I then packed bone graft in the posterolateral gutters L3-L4-L5 on the right and left sides. Prior to bone  grafting I did a final copious irrigation with both Irrisept and then normal saline.     It should be noted bone graft was prepared from locally harvested bone. The bone from the laminectomy and facetectomy was morcellized. This bone was then mixed with ViviGen allograft. The above material was used for fusion bone.     A final torque tightening was done of all the screws. Final C-arm images showed all hardware in good position. Exposed dura was covered with thrombin-soaked Gelfoam. A deep Hemovac drain was placed.     The wound was closed in layers using Vicryl and skin staples. A sterile dressing was applied. The patient was awakened and transported to recovery room in stable condition.

## 2023-08-09 NOTE — ANESTHESIA PREPROCEDURE EVALUATION
Anesthesia Evaluation     Patient summary reviewed and Nursing notes reviewed   NPO Solid Status: > 8 hours  NPO Liquid Status: > 2 hours           Airway   Mallampati: II  TM distance: >3 FB  Neck ROM: full  No difficulty expected  Dental    (+) edentulous and upper dentures    Pulmonary    (-) asthma, shortness of breath, recent URI, sleep apnea, not a smoker, no home oxygen  Cardiovascular     ECG reviewed    (+) hypertensionhyperlipidemia  (-) past MI, CAD, dysrhythmias, angina, cardiac stents    ROS comment: ECG NSR Poss LAE    Neuro/Psych  (-) seizures, CVA  GI/Hepatic/Renal/Endo    (+) renal disease (in past now OK), diabetes mellitus (A1c UNK) type 2  (-) liver disease, no thyroid disorder    Musculoskeletal     Abdominal    Substance History      OB/GYN          Other   blood dyscrasia anemia,     ROS/Med Hx Other: HCT 33  creat UNK     K normal                   Anesthesia Plan    ASA 3     general     (Propofol infusion as part of an anti PONV technique  )  intravenous induction     Anesthetic plan, risks, benefits, and alternatives have been provided, discussed and informed consent has been obtained with: patient.    Plan discussed with CRNA.      CODE STATUS:

## 2023-08-09 NOTE — PLAN OF CARE
Problem: Adult Inpatient Plan of Care  Goal: Plan of Care Review  Outcome: Ongoing, Progressing  Flowsheets (Taken 8/9/2023 1826)  Progress: improving  Plan of Care Reviewed With: patient  Goal: Patient-Specific Goal (Individualized)  Outcome: Ongoing, Progressing  Goal: Absence of Hospital-Acquired Illness or Injury  Outcome: Ongoing, Progressing  Goal: Optimal Comfort and Wellbeing  Outcome: Ongoing, Progressing  Intervention: Monitor Pain and Promote Comfort  Recent Flowsheet Documentation  Taken 8/9/2023 1750 by Lesvia Dunaway RN  Pain Management Interventions: pain management plan reviewed with patient/caregiver  Intervention: Provide Person-Centered Care  Recent Flowsheet Documentation  Taken 8/9/2023 1750 by Lesvia Dunaway RN  Trust Relationship/Rapport:   care explained   choices provided   emotional support provided   empathic listening provided   questions answered   questions encouraged   reassurance provided   thoughts/feelings acknowledged  Goal: Readiness for Transition of Care  Outcome: Ongoing, Progressing  Intervention: Mutually Develop Transition Plan  Recent Flowsheet Documentation  Taken 8/9/2023 1822 by Lesvia Dunaway RN  Transportation Anticipated: family or friend will provide  Transportation Concerns: none  Patient/Family Anticipated Services at Transition:      durable medical equipment  Patient/Family Anticipates Transition to: home with help/services   Goal Outcome Evaluation:  Plan of Care Reviewed With: patient        Progress: improving

## 2023-08-09 NOTE — H&P
Patient Name: Luna Mark  MRN: [id number]  : 1950  DOS: 2023    Attending: Darío Pickering MD    Primary Care Provider: Provider, No Known      Chief complaint: Back pain    Subjective   Patient is a pleasant 72 y.o. female presented for scheduled surgery by Dr. Pickering.  She underwent lumbar fusion under general anesthesia.  She tolerated surgery well and was admitted for further medical management.  She is to have back pain for over 3 years.  The pain radiates down bilateral lower extremities.  She denies saddle anesthesia.    When seen in PACU she is doing well.  Her pain is well-controlled.  She denies nausea, shortness of breath or chest pain.  No history of DVT or PE.    Allergies:  No Known Allergies    Meds:  Medications Prior to Admission   Medication Sig Dispense Refill Last Dose    carvedilol (COREG) 25 MG tablet Take 1 tablet by mouth 2 (Two) Times a Day With Meals.   2023 at 1700    ezetimibe (ZETIA) 10 MG tablet Take 1 tablet by mouth Daily.   2023    gabapentin (NEURONTIN) 300 MG capsule Take 2 capsules by mouth 3 (Three) Times a Day.   2023    glipizide (GLUCOTROL XL) 10 MG 24 hr tablet Take 1 tablet by mouth 2 (Two) Times a Day.   2023    hydrOXYzine (ATARAX) 25 MG tablet Take 1 tablet by mouth 4 (Four) Times a Day.   2023    lisinopril (PRINIVIL,ZESTRIL) 40 MG tablet Take 1 tablet twice a day by oral route.   2023    magnesium oxide (MAG-OX) 400 MG tablet Take 1 tablet by mouth 2 (Two) Times a Day.   2023    metFORMIN (GLUCOPHAGE) 500 MG tablet TAKE 1 TABLETS TWICE DAILY WITH MEALS - NEED MD APPOINTMENT FOR REFILLS   2023    pioglitazone (ACTOS) 45 MG tablet Take 1 tablet by mouth Every Morning.   2023    vitamin D3 125 MCG (5000 UT) capsule capsule Take 1 capsule by mouth Daily.   2023         History:   Past Medical History:   Diagnosis Date    Diabetes mellitus     H/O skin graft     Hypertension     UTI (urinary tract infection)   "    Past Surgical History:   Procedure Laterality Date    COLONOSCOPY      SKIN GRAFT      BILATERAL HANDS AND ABDOMEN     History reviewed. No pertinent family history.  Social History     Tobacco Use    Smoking status: Never    Smokeless tobacco: Never   Vaping Use    Vaping Use: Never used   Substance Use Topics    Alcohol use: Never    Drug use: Never   She lives alone and has 1 child.  She is retired nurse.    Review of Systems  Pertinent items are noted in HPI, all other systems reviewed and negative    Vital Signs  /54 (BP Location: Right arm, Patient Position: Lying)   Pulse 70   Temp 97.5 øF (36.4 øC) (Oral)   Resp 14   Ht 162.6 cm (64\")   Wt 96.5 kg (212 lb 11.9 oz)   SpO2 96%   BMI 36.52 kg/mý     Physical Exam:    General Appearance:    Alert, cooperative, in no acute distress   Head:    Normocephalic, without obvious abnormality, atraumatic   Eyes:            Lids and lashes normal, conjunctivae and sclerae normal, no   icterus, no pallor, corneas clear,    Ears:    Ears appear intact with no abnormalities noted   Throat:   No oral lesions, no thrush, oral mucosa moist   Back: Surgical dressing CDI.  Hemovac present   Lungs:     Clear to auscultation,respirations regular, even and unlabored    Heart:    Regular rhythm and normal rate, normal S1 and S2, no murmur, no gallop   Abdomen:     Normal bowel sounds, no masses, no organomegaly, soft non-tender, non-distended, no guarding, no rebound  tenderness   Genitalia:    Deferred.  Doran-yellow UOP   Extremities:   Moves all extremities well, no edema, no cyanosis, no  redness   Pulses:   Pulses palpable and equal bilaterally   Skin:   No bleeding, bruising or rash   Neurologic:   Cranial nerves 2 - 12 grossly intact. Flexion and dorsiflexion intact bilateral feet.        I reviewed the patient's new clinical results.      Latest Reference Range & Units 08/02/23 15:34   Potassium 3.5 - 5.2 mmol/L 4.7   WBC 3.40 - 10.80 10*3/mm3 4.90   RBC 3.77 " - 5.28 10*6/mm3 3.36 (L)   Hemoglobin 12.0 - 15.9 g/dL 10.6 (L)   Hematocrit 34.0 - 46.6 % 33.9 (L)   Platelets 140 - 450 10*3/mm3 202   RDW 12.3 - 15.4 % 12.9   MCV 79.0 - 97.0 fL 100.9 (H)   MCH 26.6 - 33.0 pg 31.5   MCHC 31.5 - 35.7 g/dL 31.3 (L)   MPV 6.0 - 12.0 fL 10.6   (L): Data is abnormally low  (H): Data is abnormally high    Assessment and Plan:     S/P lumbar fusion    Back pain    Hypertension    Diabetes      Plan  1. PT/OT-ambulate  2. Pain control-prns   3. IS-encourage  4. DVT proph- Mechs  5. Bowel regimen  6. Resume home medications as appropriate  7. Monitor post-op labs  8. DC planning for home    HTN  - Continue home coreg  - Monitor BP   - Holding parameters for BP meds  - Labetalol PRN for SBP>170    DM  - hgb A1c in AM  - Hold OHA for now  - Accu-Chek AC and HS with low dose SSI      Uma Shields, ROBYN  08/09/23  18:10 EDT

## 2023-08-10 PROBLEM — D62 ACUTE BLOOD LOSS ANEMIA: Status: ACTIVE | Noted: 2023-08-10

## 2023-08-10 LAB
ANION GAP SERPL CALCULATED.3IONS-SCNC: 7 MMOL/L (ref 5–15)
BUN SERPL-MCNC: 18 MG/DL (ref 8–23)
BUN/CREAT SERPL: 17 (ref 7–25)
CALCIUM SPEC-SCNC: 7.8 MG/DL (ref 8.6–10.5)
CHLORIDE SERPL-SCNC: 110 MMOL/L (ref 98–107)
CO2 SERPL-SCNC: 22 MMOL/L (ref 22–29)
CREAT SERPL-MCNC: 1.06 MG/DL (ref 0.57–1)
DEPRECATED RDW RBC AUTO: 48.5 FL (ref 37–54)
EGFRCR SERPLBLD CKD-EPI 2021: 56.3 ML/MIN/1.73
ERYTHROCYTE [DISTWIDTH] IN BLOOD BY AUTOMATED COUNT: 13.1 % (ref 12.3–15.4)
GLUCOSE BLDC GLUCOMTR-MCNC: 189 MG/DL (ref 70–130)
GLUCOSE BLDC GLUCOMTR-MCNC: 215 MG/DL (ref 70–130)
GLUCOSE BLDC GLUCOMTR-MCNC: 229 MG/DL (ref 70–130)
GLUCOSE BLDC GLUCOMTR-MCNC: 256 MG/DL (ref 70–130)
GLUCOSE SERPL-MCNC: 190 MG/DL (ref 65–99)
HBA1C MFR BLD: 7.2 % (ref 4.8–5.6)
HCT VFR BLD AUTO: 24.2 % (ref 34–46.6)
HCT VFR BLD AUTO: 26.5 % (ref 34–46.6)
HGB BLD-MCNC: 7.6 G/DL (ref 12–15.9)
HGB BLD-MCNC: 8.1 G/DL (ref 12–15.9)
MCH RBC QN AUTO: 31.7 PG (ref 26.6–33)
MCHC RBC AUTO-ENTMCNC: 31.4 G/DL (ref 31.5–35.7)
MCV RBC AUTO: 100.8 FL (ref 79–97)
PLATELET # BLD AUTO: 141 10*3/MM3 (ref 140–450)
PMV BLD AUTO: 10.9 FL (ref 6–12)
POTASSIUM SERPL-SCNC: 4.3 MMOL/L (ref 3.5–5.2)
RBC # BLD AUTO: 2.4 10*6/MM3 (ref 3.77–5.28)
SODIUM SERPL-SCNC: 139 MMOL/L (ref 136–145)
WBC NRBC COR # BLD: 8.4 10*3/MM3 (ref 3.4–10.8)

## 2023-08-10 PROCEDURE — 63710000001 INSULIN LISPRO (HUMAN) PER 5 UNITS: Performed by: NURSE PRACTITIONER

## 2023-08-10 PROCEDURE — 97110 THERAPEUTIC EXERCISES: CPT

## 2023-08-10 PROCEDURE — 25010000002 HYDROMORPHONE 1 MG/ML SOLUTION: Performed by: ORTHOPAEDIC SURGERY

## 2023-08-10 PROCEDURE — 97165 OT EVAL LOW COMPLEX 30 MIN: CPT

## 2023-08-10 PROCEDURE — 82948 REAGENT STRIP/BLOOD GLUCOSE: CPT

## 2023-08-10 PROCEDURE — 25010000002 SODIUM CHLORIDE 0.9 % WITH KCL 20 MEQ 20-0.9 MEQ/L-% SOLUTION: Performed by: ORTHOPAEDIC SURGERY

## 2023-08-10 PROCEDURE — 25010000002 CEFAZOLIN IN DEXTROSE 2-4 GM/100ML-% SOLUTION: Performed by: ORTHOPAEDIC SURGERY

## 2023-08-10 PROCEDURE — 80048 BASIC METABOLIC PNL TOTAL CA: CPT | Performed by: NURSE PRACTITIONER

## 2023-08-10 PROCEDURE — 25010000002 ONDANSETRON PER 1 MG: Performed by: ORTHOPAEDIC SURGERY

## 2023-08-10 PROCEDURE — 63710000001 INSULIN DETEMIR PER 5 UNITS

## 2023-08-10 PROCEDURE — 85018 HEMOGLOBIN: CPT

## 2023-08-10 PROCEDURE — 85014 HEMATOCRIT: CPT

## 2023-08-10 PROCEDURE — 83036 HEMOGLOBIN GLYCOSYLATED A1C: CPT | Performed by: NURSE PRACTITIONER

## 2023-08-10 PROCEDURE — 97535 SELF CARE MNGMENT TRAINING: CPT

## 2023-08-10 PROCEDURE — 97161 PT EVAL LOW COMPLEX 20 MIN: CPT

## 2023-08-10 PROCEDURE — 85027 COMPLETE CBC AUTOMATED: CPT | Performed by: NURSE PRACTITIONER

## 2023-08-10 RX ADMIN — CEFAZOLIN SODIUM 2000 MG: 2 INJECTION, SOLUTION INTRAVENOUS at 03:25

## 2023-08-10 RX ADMIN — HYDROXYZINE HYDROCHLORIDE 25 MG: 25 TABLET, FILM COATED ORAL at 12:07

## 2023-08-10 RX ADMIN — SODIUM CHLORIDE 500 ML: 9 INJECTION, SOLUTION INTRAVENOUS at 01:06

## 2023-08-10 RX ADMIN — ONDANSETRON 4 MG: 2 INJECTION INTRAMUSCULAR; INTRAVENOUS at 12:07

## 2023-08-10 RX ADMIN — FAMOTIDINE 20 MG: 20 TABLET ORAL at 08:37

## 2023-08-10 RX ADMIN — INSULIN LISPRO 3 UNITS: 100 INJECTION, SOLUTION INTRAVENOUS; SUBCUTANEOUS at 08:36

## 2023-08-10 RX ADMIN — HYDROMORPHONE HYDROCHLORIDE 1 MG: 1 INJECTION, SOLUTION INTRAMUSCULAR; INTRAVENOUS; SUBCUTANEOUS at 08:37

## 2023-08-10 RX ADMIN — INSULIN DETEMIR 10 UNITS: 100 INJECTION, SOLUTION SUBCUTANEOUS at 21:01

## 2023-08-10 RX ADMIN — SODIUM CHLORIDE 1000 ML: 9 INJECTION, SOLUTION INTRAVENOUS at 19:42

## 2023-08-10 RX ADMIN — Medication 3 ML: at 19:44

## 2023-08-10 RX ADMIN — HYDROMORPHONE HYDROCHLORIDE 1 MG: 1 INJECTION, SOLUTION INTRAMUSCULAR; INTRAVENOUS; SUBCUTANEOUS at 14:35

## 2023-08-10 RX ADMIN — OXYCODONE HYDROCHLORIDE AND ACETAMINOPHEN 1 TABLET: 10; 325 TABLET ORAL at 12:07

## 2023-08-10 RX ADMIN — Medication 3 ML: at 08:38

## 2023-08-10 RX ADMIN — HYDROXYZINE HYDROCHLORIDE 25 MG: 25 TABLET, FILM COATED ORAL at 19:44

## 2023-08-10 RX ADMIN — POTASSIUM CHLORIDE AND SODIUM CHLORIDE 100 ML/HR: 900; 150 INJECTION, SOLUTION INTRAVENOUS at 03:13

## 2023-08-10 RX ADMIN — HYDROCODONE BITARTRATE AND ACETAMINOPHEN 1 TABLET: 7.5; 325 TABLET ORAL at 23:46

## 2023-08-10 RX ADMIN — HYDROCODONE BITARTRATE AND ACETAMINOPHEN 1 TABLET: 7.5; 325 TABLET ORAL at 19:54

## 2023-08-10 RX ADMIN — HYDROCODONE BITARTRATE AND ACETAMINOPHEN 1 TABLET: 7.5; 325 TABLET ORAL at 03:25

## 2023-08-10 RX ADMIN — ONDANSETRON 4 MG: 2 INJECTION INTRAMUSCULAR; INTRAVENOUS at 06:09

## 2023-08-10 RX ADMIN — INSULIN LISPRO 3 UNITS: 100 INJECTION, SOLUTION INTRAVENOUS; SUBCUTANEOUS at 12:08

## 2023-08-10 RX ADMIN — INSULIN LISPRO 4 UNITS: 100 INJECTION, SOLUTION INTRAVENOUS; SUBCUTANEOUS at 16:47

## 2023-08-10 RX ADMIN — SODIUM CHLORIDE, POTASSIUM CHLORIDE, SODIUM LACTATE AND CALCIUM CHLORIDE 100 ML/HR: 600; 310; 30; 20 INJECTION, SOLUTION INTRAVENOUS at 14:29

## 2023-08-10 RX ADMIN — SODIUM CHLORIDE 500 ML: 9 INJECTION, SOLUTION INTRAVENOUS at 12:08

## 2023-08-10 RX ADMIN — INSULIN LISPRO 2 UNITS: 100 INJECTION, SOLUTION INTRAVENOUS; SUBCUTANEOUS at 21:00

## 2023-08-10 RX ADMIN — FAMOTIDINE 20 MG: 20 TABLET ORAL at 19:45

## 2023-08-10 RX ADMIN — HYDROXYZINE HYDROCHLORIDE 25 MG: 25 TABLET, FILM COATED ORAL at 08:37

## 2023-08-10 NOTE — THERAPY EVALUATION
Patient Name: uLna Mark  : 1950    MRN: [id number]                              Today's Date: 8/10/2023       Admit Date: 2023    Visit Dx: No diagnosis found.  Patient Active Problem List   Diagnosis    Back pain    S/P lumbar fusion    Hypertension    Diabetes     Past Medical History:   Diagnosis Date    Diabetes mellitus     H/O skin graft     Hypertension     UTI (urinary tract infection)      Past Surgical History:   Procedure Laterality Date    COLONOSCOPY      SKIN GRAFT      BILATERAL HANDS AND ABDOMEN      General Information       Row Name 08/10/23 0856          Physical Therapy Time and Intention    Document Type evaluation  -CM     Mode of Treatment physical therapy;individual therapy  -CM       Row Name 08/10/23 0856          General Information    Patient Profile Reviewed yes  -CM     Prior Level of Function independent:;all household mobility;ADL's;driving  ind no AD  -CM     Existing Precautions/Restrictions fall;spinal;other (see comments)  hemovac  -CM     Barriers to Rehab none identified  -CM       Row Name 08/10/23 0856          Living Environment    People in Home alone;other (see comments)  friend plan to check on her intermittently at D/C  -CM       Row Name 08/10/23 0856          Home Main Entrance    Number of Stairs, Main Entrance one  -CM     Stair Railings, Main Entrance none  -CM       Row Name 08/10/23 0856          Stairs Within Home, Primary    Number of Stairs, Within Home, Primary none  -CM       Row Name 08/10/23 0856          Cognition    Orientation Status (Cognition) oriented x 4  -CM       Row Name 08/10/23 0856          Safety Issues, Functional Mobility    Safety Issues Affecting Function (Mobility) insight into deficits/self-awareness;safety precaution awareness;safety precautions follow-through/compliance  -CM     Impairments Affecting Function (Mobility) balance;endurance/activity tolerance;pain;postural/trunk control;strength  -CM               User  Key  (r) = Recorded By, (t) = Taken By, (c) = Cosigned By      Initials Name Provider Type    CM Shwetha Ferro, PT Physical Therapist                   Mobility       Row Name 08/10/23 0857          Bed Mobility    Bed Mobility supine-sit  -CM     Supine-Sit [place] (Bed Mobility) moderate assist (50% patient effort);1 person assist;verbal cues  -CM     Assistive Device (Bed Mobility) bed rails  -CM     Comment, (Bed Mobility) patient instructed on spinal precautions and logroll technique, verbal and tactile cues provided for sequencing, patient requires modA to roll, bring legs off EOB, and lift trunk from flat HOB, she reports dizziness at EOB and BP noted to be 82/58, present and aware  -CM       Row Name 08/10/23 0857          Sit-Stand Transfer    Sit-Stand [place] (Transfers) minimum assist (75% patient effort);1 person assist;verbal cues  -CM     Assistive Device (Sit-Stand Transfers) walker, front-wheeled  -CM     Comment, (Sit-Stand Transfer) cues for safe hand placement, [employer] to clear hips, mild dizziness upon standing that resolves quickly, standing BP was 110/54  -CM       Row Name 08/10/23 0857          Gait/Stairs (Locomotion)    [place] Level (Gait) contact guard;1 person assist;verbal cues;1 person to manage equipment  -CM     Assistive Device (Gait) walker, front-wheeled  -CM     Distance in Feet (Gait) 20  -CM     Deviations/Abnormal Patterns (Gait) bilateral deviations;tor decreased;gait speed decreased;stride length decreased  -CM     Bilateral Gait Deviations forward flexed posture;heel strike decreased  -CM     Comment, (Gait/Stairs) Patient ambulated in [place] with a step through gait pattern although with very decreased stride length. Cues provided for upright posture with forward gaze as well as decreased weightbearing through walker. Patient limited by pain and also fatigues very quickly requiring a chair follow and ride back to room in chair. VSS following  ambulation on RA.  -CM               User Key  (r) = Recorded By, (t) = Taken By, (c) = Cosigned By      Initials Name Provider Type    Shwetha Dailey PT Physical Therapist                   Obj/Interventions       Row Name 08/10/23 0901          Range of Motion Comprehensive    General Range of Motion bilateral lower extremity ROM WFL  -       Row Name 08/10/23 0901          Strength Comprehensive (MMT)    General Manual Muscle Testing (MMT) Assessment lower extremity strength deficits identified  -     Comment, General Manual Muscle Testing (MMT) Assessment Formal MMT deferred due to increased back pain, BLEs grossly 3+/5 at least based on observation of functional activity  -       Row Name 08/10/23 0901          Motor Skills    Therapeutic Exercise hip;knee;ankle;other (see comments)  abdominal sets x3, increased reps deferred due to increased pain/nausea  -       Row Name 08/10/23 0901          Hip (Therapeutic Exercise)    Hip (Therapeutic Exercise) isometric exercises  -CM     Hip Isometrics (Therapeutic Exercise) bilateral;gluteal sets;3 repetitions;3 second hold  -CM       Row Name 08/10/23 0901          Knee (Therapeutic Exercise)    Knee (Therapeutic Exercise) isometric exercises  -CM     Knee Isometrics (Therapeutic Exercise) bilateral;quad sets;3 repetitions;3 second hold  -CM       Row Name 08/10/23 0901          Ankle (Therapeutic Exercise)    Ankle (Therapeutic Exercise) AROM (active range of motion)  -CM     Ankle AROM (Therapeutic Exercise) bilateral;dorsiflexion;plantarflexion;10 repetitions  -CM       Row Name 08/10/23 0901          Balance    Balance Assessment sitting static balance;standing static balance;standing dynamic balance  -CM     Static Sitting Balance contact guard  -CM     Position, Sitting Balance unsupported;sitting edge of bed  -CM     Static Standing Balance contact guard  -CM     Dynamic Standing Balance contact guard  -CM     Position/Device Used, Standing  Balance supported;walker, front-wheeled  -CM     Comment, Balance no LOB  -CM       Row Name 08/10/23 0901          Sensory Assessment (Somatosensory)    Sensory Assessment (Somatosensory) LE sensation intact  -CM               User Key  (r) = Recorded By, (t) = Taken By, (c) = Cosigned By      Initials Name Provider Type    CM Shwetha Ferro, PT Physical Therapist                   Goals/Plan       Row Name 08/10/23 0907          Bed Mobility Goal 1 (PT)    Activity/Assistive Device (Bed Mobility Goal 1, PT) sit to supine/supine to sit  -CM     [place] Level/Cues Needed (Bed Mobility Goal 1, PT) supervision required  -CM     Time Frame (Bed Mobility Goal 1, PT) long term goal (LTG);10 days  -CM     Progress/Outcomes (Bed Mobility Goal 1, PT) new goal  -CM       Row Name 08/10/23 0907          Transfer Goal 1 (PT)    Activity/Assistive Device (Transfer Goal 1, PT) sit-to-stand/stand-to-sit;bed-to-chair/chair-to-bed  -CM     [place] Level/Cues Needed (Transfer Goal 1, PT) standby assist  -CM     Time Frame (Transfer Goal 1, PT) long term goal (LTG);10 days  -CM     Progress/Outcome (Transfer Goal 1, PT) new goal  -CM       Row Name 08/10/23 0907          Gait Training Goal 1 (PT)    Activity/Assistive Device (Gait Training Goal 1, PT) gait (walking locomotion);assistive device use  -CM     [place] Level (Gait Training Goal 1, PT) standby assist  -CM     Distance (Gait Training Goal 1, PT) 350'  -CM     Time Frame (Gait Training Goal 1, PT) long term goal (LTG);10 days  -CM     Progress/Outcome (Gait Training Goal 1, PT) new goal  -CM       Row Name 08/10/23 0907          Stairs Goal 1 (PT)    Activity/Assistive Device (Stairs Goal 1, PT) ascending stairs;descending stairs  -CM     [place] Level/Cues Needed (Stairs Goal 1, PT) contact guard required  -CM     Number of Stairs (Stairs Goal 1, PT) 1  -CM     Time Frame (Stairs Goal 1, PT) long term goal (LTG);10 days  -CM     Progress/Outcome  (Stairs Goal 1, PT) new goal  -       Row Name 08/10/23 0907          Therapy Assessment/Plan (PT)    Planned Therapy Interventions (PT) balance training;bed mobility training;gait training;home exercise program;stretching;strengthening;stair training;ROM (range of motion);postural re-education;transfer training;patient/family education  -               User Key  (r) = Recorded By, (t) = Taken By, (c) = Cosigned By      Initials Name Provider Type    CM Shwetha Ferro, PT Physical Therapist                   Clinical Impression       Row Name 08/10/23 0903          Pain    Pretreatment Pain Rating 7/10  -CM     Posttreatment Pain Rating 7/10  -CM     Pain Location - Side/Orientation Bilateral  -CM     Pain Location generalized  -CM     Pain Location - back  -CM     Pre/Posttreatment Pain Comment RN present and administering pain medication during eval  -CM     Pain Intervention(s) Ambulation/increased activity;Repositioned;Nursing Notified  -       Row Name 08/10/23 0903          Plan of Care Review    Plan of Care Reviewed With patient  -CM     Outcome Evaluation Patient presents with increased pain as well as generalized deficits in strength, endurance, and balance. She ambulated 20' CGAx1+1 for chair follow with FWW limited by both pain and nausea. IPPT is indicated to address current deficits. Recommend D/C to IPR at this time as patient lives alone. Patient prefers D/C home, she will need to show improvement with mobility in order for this to be a safe discharge plan. Will continue to assess D/C plan as pain/nausea improves.  -       Row Name 08/10/23 0903          Therapy Assessment/Plan (PT)    Rehab Potential (PT) good, to achieve stated therapy goals  -CM     Criteria for Skilled Interventions Met (PT) yes;meets criteria;skilled treatment is necessary  -     Therapy Frequency (PT) daily  -CM       Row Name 08/10/23 0903          Vital Signs    Pre Systolic BP Rehab 104  -CM     Pre Treatment  Diastolic BP 49  -CM     Intra Systolic BP Rehab 82  -CM     Intra Treatment Diastolic BP 58  -CM     Post Systolic BP Rehab 110  -CM     Post Treatment Diastolic BP 54  -CM     Pretreatment Heart Rate (beats/min) 85  -CM     Posttreatment Heart Rate (beats/min) 91  -CM     Pre SpO2 (%) 95  -CM     O2 Delivery Pre Treatment nasal cannula  -CM     Intra SpO2 (%) 97  -CM     O2 Delivery Intra Treatment room air  -CM     Post SpO2 (%) 96  -CM     O2 Delivery Post Treatment nasal cannula  -CM     Pre Patient Position Supine  -CM     Intra Patient Position Standing  -CM     Post Patient Position Sitting  -CM       Row Name 08/10/23 0903          Positioning and Restraints    Pre-Treatment Position in bed  -CM     Post Treatment Position chair  -CM     In Chair reclined;call light within reach;encouraged to call for assist;exit alarm on;waffle cushion;on mechanical lift sling;notified nsg  -CM               User Key  (r) = Recorded By, (t) = Taken By, (c) = Cosigned By      Initials Name Provider Type    CM Shwetha Ferro, PT Physical Therapist                   Outcome Measures       Row Name 08/10/23 0907          How much help from another person do you currently need...    Turning from your back to your side while in flat bed without using bedrails? 2  -CM     Moving from lying on back to sitting on the side of a flat bed without bedrails? 2  -CM     Moving to and from a bed to a chair (including a wheelchair)? 3  -CM     Standing up from a chair using your arms (e.g., wheelchair, bedside chair)? 3  -CM     Climbing 3-5 steps with a railing? 2  -CM     To walk in hospital room? 3  -CM     AM-PAC 6 Clicks Score (PT) 15  -CM     Highest level of mobility 4 --> Transferred to chair/commode  -CM       Row Name 08/10/23 0907          Functional Assessment    Outcome Measure Options AM-PAC 6 Clicks Basic Mobility (PT)  -CM               User Key  (r) = Recorded By, (t) = Taken By, (c) = Cosigned By      Initials Name  Provider Type    Shwetha Dailey, GHADA Physical Therapist                                 Physical Therapy Education       Title: PT OT SLP Therapies (Done)       Topic: Physical Therapy (Done)       Point: Mobility training (Done)       Learning Progress Summary             Patient Acceptance, E, VU by CHAPINCITO at 8/10/2023 0908                         Point: Home exercise program (Done)       Learning Progress Summary             Patient Acceptance, E, VU by CM at 8/10/2023 0908                         Point: Body mechanics (Done)       Learning Progress Summary             Patient Acceptance, E, VU by CHAPINCITO at 8/10/2023 0908                         Point: Precautions (Done)       Learning Progress Summary             Patient Acceptance, E, VU by CM at 8/10/2023 0908                                         User Key       Initials Effective Dates Name Provider Type Discipline     09/22/22 -  Shwetha Ferro PT Physical Therapist PT                  PT Recommendation and Plan  Planned Therapy Interventions (PT): balance training, bed mobility training, gait training, home exercise program, stretching, strengthening, stair training, ROM (range of motion), postural re-education, transfer training, patient/family education  Plan of Care Reviewed With: patient  Outcome Evaluation: Patient presents with increased pain as well as generalized deficits in strength, endurance, and balance. She ambulated 20' CGAx1+1 for chair follow with FWW limited by both pain and nausea. IPPT is indicated to address current deficits. Recommend D/C to IPR at this time as patient lives alone. Patient prefers D/C home, she will need to show improvement with mobility in order for this to be a safe discharge plan. Will continue to assess D/C plan as pain/nausea improves.     Time Calculation:   PT Evaluation Complexity  History, PT Evaluation Complexity: 1-2 personal factors and/or comorbidities  Examination of Body Systems (PT Eval  Complexity): total of 3 or more elements  Clinical Presentation (PT Evaluation Complexity): evolving  Clinical Decision Making (PT Evaluation Complexity): low complexity  Overall Complexity (PT Evaluation Complexity): low complexity     PT Charges       Row Name 08/10/23 0909             Time Calculation    Start Time 0821  -CM      PT Received On 08/10/23  -CM      PT Goal Re-Cert Due Date 08/20/23  -CM         Timed Charges    44620 - PT Therapeutic Exercise Minutes 10  -CM         Untimed Charges    PT Eval/Re-eval Minutes 49  -CM         Total Minutes    Timed Charges Total Minutes 10  -CM      Untimed Charges Total Minutes 49  -CM       Total Minutes 59  -CM                User Key  (r) = Recorded By, (t) = Taken By, (c) = Cosigned By      Initials Name Provider Type    Shwetha Dailey, PT Physical Therapist                  Therapy Charges for Today       Code Description Service Date Service Provider Modifiers Qty    08671027780 HC PT THER PROC EA 15 MIN 8/10/2023 Shwetha Ferro, PT GP 1    08026515027 HC PT EVAL LOW COMPLEXITY 4 8/10/2023 Shwetha Ferro, PT GP 1    20996232985 HC PT THER SUPP EA 15 MIN 8/10/2023 Shwetha Ferro, PT GP 2            PT G-Codes  Outcome Measure Options: AM-PAC 6 Clicks Basic Mobility (PT)  AM-PAC 6 Clicks Score (PT): 15  PT Discharge Summary  Anticipated Discharge Disposition (PT): inpatient rehabilitation facility    Shwetha Ferro PT  8/10/2023

## 2023-08-10 NOTE — THERAPY EVALUATION
Patient Name: Luna Mark  : 1950    MRN: [id number]                              Today's Date: 8/10/2023       Admit Date: 2023    Visit Dx: No diagnosis found.  Patient Active Problem List   Diagnosis    Back pain    S/P lumbar fusion    Hypertension    Diabetes     Past Medical History:   Diagnosis Date    Diabetes mellitus     H/O skin graft     Hypertension     UTI (urinary tract infection)      Past Surgical History:   Procedure Laterality Date    COLONOSCOPY      LUMBAR DISCECTOMY FUSION INSTRUMENTATION N/A 2023    Procedure: LAMINECTOMY SPINAL FUSION L3-4, L4-5;  Surgeon: Darío Pickering MD;  Location: [facility];  Service: Orthopedic Spine;  Laterality: N/A;    SKIN GRAFT      BILATERAL HANDS AND ABDOMEN      General Information       Row Name 08/10/23 1057          OT Time and Intention    Document Type evaluation  -JY     Mode of Treatment occupational therapy;individual therapy  -       Row Name 08/10/23 105          General Information    Patient Profile Reviewed yes  -JY     Prior Level of Function min assist:;all household mobility;gait;transfer;bed mobility;dressing;bathing;independent:;feeding;grooming;home management;cooking;cleaning;driving  pt grossly I in all ADLs, related t/fs and mobility w/o AD A, pt recently limited by back pain making LB ADLs and ADL related mobility more difficult  -JY     Existing Precautions/Restrictions fall;spinal;other (see comments)  hemovac  -JY     Barriers to Rehab medically complex  currently limited by nausea, pain, dizziness post op  -       Row Name 08/10/23 1057          Occupational Profile    Environmental Supports and Barriers (Occupational Profile) step over tub w/ seat, elevated toilet w/ grabbar supports, DME: no AD used at baseline  -       Row Name 08/10/23 105          Living Environment    People in Home alone;other (see comments)  pt has friend that is able to check in on pt periodically, likely daily  -       [place]  Name 08/10/23 1057          Home Main Entrance    Number of Stairs, Main Entrance one  -JY     Stair Railings, Main Entrance none  -JY       Row Name 08/10/23 1057          Stairs Within Home, Primary    Number of Stairs, Within Home, Primary none  -JY       Row Name 08/10/23 1057          Cognition    Orientation Status (Cognition) oriented x 4  -JY       Row Name 08/10/23 1057          Safety Issues, Functional Mobility    Safety Issues Affecting Function (Mobility) insight into deficits/self-awareness;safety precaution awareness;safety precautions follow-through/compliance;awareness of need for assistance  -JY     Impairments Affecting Function (Mobility) balance;endurance/activity tolerance;pain;postural/trunk control;strength  -JY     Comment, Safety Issues/Impairments (Mobility) pt alert and able to follow commands, pt able to recall 2/3 spinal precautions prior to educational review; pt limited this date by nausea, dizziness and pain  -TREVY               User Key  (r) = Recorded By, (t) = Taken By, (c) = Cosigned By      Initials Name Provider Type    Olga Castro OT Occupational Therapist                     Mobility/ADL's       Row Name 08/10/23 1105          Bed Mobility    Bed Mobility other (see comments)  pt received UIC  -JY     Supine-Sit [place] (Bed Mobility) not tested  -JELI     Comment, (Bed Mobility) pt received UIC and preferred to remain OOB thus bed mobility not assessed this date, pt able to verbally return demo log roll tech for spinal precautions compliance, will need further assessment with authentic completion  -JY       Row Name 08/10/23 1105          Transfers    Transfers sit-stand transfer;stand-sit transfer  -JELI     Comment, (Transfers) skilled cues for optimal hand placement for controlled ascend, descend specifically to push up from seated surface vs reaching to FWW and attempting to push/pull at AD in order to reduce fall risk and improve leverage to stand safely; further  educated pt to reach back prior to sitting and throughout all to maintain spinal precautions largely avoiding forward flexion at trunk  -TREVY       Row Name 08/10/23 1105          Sit-Stand Transfer    Sit-Stand [place] (Transfers) minimum assist (75% patient effort);verbal cues  -JY     Assistive Device (Sit-Stand Transfers) walker, front-wheeled  -JY     Comment, (Sit-Stand Transfer) dizziness, incisional back pain and nausea persistent in all t/fs  -QUEENIE       Row Name 08/10/23 1105          Stand-Sit Transfer    Stand-Sit [place] (Transfers) minimum assist (75% patient effort);verbal cues  -JY     Assistive Device (Stand-Sit Transfers) walker, front-wheeled  -[employer]       Row Name 08/10/23 1105          Functional Mobility    Functional Mobility- Ind. Level not tested  -     Functional Mobility- Comment defer to PT for specifics; u/a to assess any functional  mobility this date d/t pt's increased s/s of nausea w/ emesis, incisional back pain (6-/10) and dizziness at rest and in standing; pt only able to tolerate STS this date  -ELI       Row Name 08/10/23 1105          Activities of Daily Living    BADL Assessment/Intervention bathing;upper body dressing;lower body dressing;grooming  -[employer]ELI       Row Name 08/10/23 1105          Bathing Assessment/Intervention    [employer] [place] Level (Bathing) bathing skills;distal lower extremities/feet;other (see comments)  did not assess authentic bathing this date however reviewed use of LH sponge issued to aid in distal reach  -JY     Position (Bathing) supported sitting  -JY     Comment, (Bathing) OT did not assess authentic bathing however educated pt on optimal position, tech, seq for LBB and use of LH sponge to aid in distal reach while adhering to spinal precautions throughout and maximizing safety and I; pt able to return demo simulated reach toward feet with LH sponge  -[employer]       Row Name 08/10/23 1105          Upper Body Dressing Assessment/Training    [employer] [place]  Level (Upper Body Dressing) doff;don;pajama/robe  -JY     Position (Upper Body Dressing) supported sitting  -JY     Comment, (Upper Body Dressing) attempted to d/d gown however pt became more nauseous w/ emesis during observed UE movement that occurred thus far in dressing, appears pt would require at least min A for task given skill set and s/s  -JY       Row Name 08/10/23 1105          Lower Body Dressing Assessment/Training    [place] Level (Lower Body Dressing) socks;doff;minimum assist (75% patient effort);don;contact guard assist  -JY     Assistive Devices (Lower Body Dressing) sock-aid;reacher;long-handled shoe horn  -JY     Position (Lower Body Dressing) unsupported sitting  -JY     Comment, (Lower Body Dressing) OT educated pt on LBD with implications to task d/t spinal precautions, educated pt on optimal position, tech, seq for d/d LB garments while adhering to precautions; increased time and upward of min A for doffing socks w/ reacher and cues for preferred mgmt at heels; CGA for re donning socks with sock aid; pt performance improved with use of AE as indicated as pt u/a to demo compensatory or adaptive strategies prior to AE  -JY       Row Name 08/10/23 1105          Grooming Assessment/Training    [place] Level (Grooming) wash face, hands;set up  -JY     Position (Grooming) supported sitting  -JY               User Key  (r) = Recorded By, (t) = Taken By, (c) = Cosigned By      Initials Name Provider Type    Olga Castro OT Occupational Therapist                   Obj/Interventions       Row Name 08/10/23 1122          Sensory Assessment (Somatosensory)    Sensory Assessment (Somatosensory) bilateral UE;sensation intact  -JY     Bilateral UE Sensory Assessment general sensation;light touch awareness;intact  -JY     Sensory Assessment denies any numbness or tingling and able to recognize LT stimuli as intact and symmetrical at BUEs  -JY       Row Name 08/10/23 1122          Vision  Assessment/Intervention    Visual Impairment/Limitations corrective lenses full-time  -JY     Vision Assessment Comment denies any acute changes to vision  -JY       Row Name 08/10/23 1122          Range of Motion Comprehensive    General Range of Motion bilateral upper extremity ROM WFL  -JY     Comment, General Range of Motion BUE AROM WFL however movement completed appeared to increase pt's s/s of nausea, pain and dizziness  -JY       Row Name 08/10/23 1122          Strength Comprehensive (MMT)    General Manual Muscle Testing (MMT) Assessment upper extremity strength deficits identified  -JY     Comment, General Manual Muscle Testing (MMT) Assessment formal MMT deferred due to recent back sx and current pain and s/s of nausea and dizziness with movement at UEs increasing s/s; based on observation pt functionally at least 3+/5  -JY       [facility] Name 08/10/23 1122          Motor Skills    Motor Skills functional endurance  -JY     Functional Endurance decreased activity tolerance toward more dynamic tasks, limited by s/s of nausea, pain and dizziness  -JY       [facility] Name 08/10/23 1122          Balance    Balance Assessment sitting static balance;sitting dynamic balance;standing static balance;standing dynamic balance  -JY     Static Sitting Balance contact guard  -JY     Dynamic Sitting Balance contact guard  -JY     Position, Sitting Balance supported;unsupported;sitting in chair  -JY     Static Standing Balance contact guard;verbal cues  -JY     Dynamic Standing Balance contact guard;verbal cues  -JY     Position/Device Used, Standing Balance supported;walker, front-wheeled  -JY     Balance Interventions sitting;standing;static;dynamic;sit to stand;supported;occupation based/functional task  -JY     Comment, Balance no overt LOB during seated or standing tasks, utilized FWW in standing  -JY               User Key  (r) = Recorded By, (t) = Taken By, (c) = Cosigned By      Initials Name Provider Type    QUEENIE Eng  FELICE Espinoza Occupational Therapist                   Goals/Plan       Row Name 08/10/23 1310          Bed Mobility Goal 1 (OT)    Activity/Assistive Device (Bed Mobility Goal 1, OT) rolling to left;rolling to right;sidelying to sit/sit to sidelying;other (see comments)  w/ adherence to spinal precautions  -JY     [place] Level/Cues Needed (Bed Mobility Goal 1, OT) minimum assist (75% or more patient effort);verbal cues required  -JY     Time Frame (Bed Mobility Goal 1, OT) long term goal (LTG);by discharge  -JY     Progress/Outcomes (Bed Mobility Goal 1, OT) new goal  -JY       Row Name 08/10/23 1310          Transfer Goal 1 (OT)    Activity/Assistive Device (Transfer Goal 1, OT) sit-to-stand/stand-to-sit;bed-to-chair/chair-to-bed;toilet;commode, bedside without drop arms;walker, rolling  -JY     [place] Level/Cues Needed (Transfer Goal 1, OT) standby assist;contact guard required;verbal cues required  -JY     Time Frame (Transfer Goal 1, OT) long term goal (LTG);by discharge  -JY     Progress/Outcome (Transfer Goal 1, OT) new goal  -JY       Row Name 08/10/23 1310          Dressing Goal 1 (OT)    Activity/Device (Dressing Goal 1, OT) lower body dressing;other (see comments)  d/d LB garments with AE PRN while adhering to spinal precautions throughout  -JY     [place]/Cues Needed (Dressing Goal 1, OT) minimum assist (75% or more patient effort);verbal cues required  -JY     Time Frame (Dressing Goal 1, OT) long term goal (LTG);by discharge  -JY     Progress/Outcome (Dressing Goal 1, OT) new goal  -JY       Row Name 08/10/23 1310          Toileting Goal 1 (OT)    Activity/Device (Toileting Goal 1, OT) adjust/manage clothing;perform perineal hygiene;commode;commode, bedside without drop arms;grab bar/safety frame;raised toilet seat;toilet paper aid  -JY     [place] Level/Cues Needed (Toileting Goal 1, OT) minimum assist (75% or more patient effort);verbal cues required  -JY     Time Frame (Toileting  Goal 1, OT) long term goal (LTG);by discharge  -JY     Progress/Outcome (Toileting Goal 1, OT) new goal  -JY       Row Name 08/10/23 1310          Therapy Assessment/Plan (OT)    Planned Therapy Interventions (OT) activity tolerance training;adaptive equipment training;BADL retraining;functional balance retraining;occupation/activity based interventions;patient/caregiver education/training;ROM/therapeutic exercise;strengthening exercise;transfer/mobility retraining  -JY               User Key  (r) = Recorded By, (t) = Taken By, (c) = Cosigned By      Initials Name Provider Type    Olga Castro OT Occupational Therapist                   Clinical Impression       Row Name 08/10/23 1128          Pain Assessment    Pretreatment Pain Rating 6/10  -JY     Posttreatment Pain Rating 8/10  -JY     Pain Location - Side/Orientation Bilateral  -JY     Pain Location incisional  -JY     Pain Location - back  -JY     Pre/Posttreatment Pain Comment ptt with persistent pain rating, increased with standing, RN aware and managing meds  -JY     Pain Intervention(s) Repositioned;Ambulation/increased activity;Rest;Nursing Notified  -JY       Row Name 08/10/23 1128          Plan of Care Review    Plan of Care Reviewed With patient  -JY     Progress no change  OT IE  -JY     Outcome Evaluation OT evaluation completed. Postoperatively, pt presents with decreased I in ADLs, related t/fs, mobility limited by decreased activity tolerance, impaired balance, muscle weakness at BUEs, decreased distal reach impacting LB ADLs and this date persistent nausea, dizziness and pain upward of 8/10. OT issued pt LH AE to facilitate more I and safe LB ADLs while adhering to spinal precautions. Pt demonstrated improved performance in d/d socks with AE and anticipate greater gains with further training. Pt completed STS w/ CGA and FWW. Progressive mobility limited by pt's s/s, pt with lower BP, RN aware. Based on pt presentation this date pt is below  occupational performance baseline and would benefit from IPOT POC and IRF at d/c fro best outcomes.  -JY       Row Name 08/10/23 1128          Therapy Assessment/Plan (OT)    Patient/Family Therapy Goal Statement (OT) to maximize I in ADLs, related t/fs, mobility, return to PLOF  -JY     Rehab Potential (OT) good, to achieve stated therapy goals  -JY     Criteria for Skilled Therapeutic Interventions Met (OT) yes;skilled treatment is necessary  -JY     Therapy Frequency (OT) daily  -JY       Row Name 08/10/23 1128          Therapy Plan Review/Discharge Plan (OT)    Equipment Needs Upon Discharge (OT) dressing equipment;bathing equipment  -JY     Anticipated Discharge Disposition (OT) inpatient rehabilitation facility  -JY       Row Name 08/10/23 1128          Vital Signs    Pre Systolic BP Rehab 99  -JY     Pre Treatment Diastolic BP 44  -JY     Post Systolic BP Rehab 101  -JY     Post Treatment Diastolic BP 53  -JY     Pretreatment Heart Rate (beats/min) 81  -JY     Posttreatment Heart Rate (beats/min) 90  -JY     Pre SpO2 (%) 94  -JY     O2 Delivery Pre Treatment supplemental O2  -JY     O2 Delivery Intra Treatment supplemental O2  -JY     Post SpO2 (%) 98  -JY     O2 Delivery Post Treatment supplemental O2  -JY     Pre Patient Position Sitting  -JY     Intra Patient Position Standing  -JY     Post Patient Position Sitting  -JY       Row Name 08/10/23 1128          Positioning and Restraints    Pre-Treatment Position sitting in chair/recliner  -JY     Post Treatment Position chair  -JY     In Chair notified nsg;reclined;call light within reach;encouraged to call for assist;exit alarm on;waffle cushion;on mechanical lift sling;legs elevated  pt declined SCDs despite education on purpose, RN notified and pt educated on ankle pumps and seated movement to assist in promoting blood flow  -JY               User Key  (r) = Recorded By, (t) = Taken By, (c) = Cosigned By      Initials Name Provider Type    QUEENIE Eng  FELICE Espinoza Occupational Therapist                   Outcome Measures       Row Name 08/10/23 1316          How much help from another is currently needed...    Putting on and taking off regular lower body clothing? 2  -JY     Bathing (including washing, rinsing, and drying) 2  -JY     Toileting (which includes using toilet bed pan or urinal) 2  -JY     Putting on and taking off regular upper body clothing 3  -JY     Taking care of personal grooming (such as brushing teeth) 3  -JY     Eating meals 3  -JY     AM-PAC 6 Clicks Score (OT) 15  -JY       Row Name 08/10/23 0907          How much help from another person do you currently need...    Turning from your back to your side while in flat bed without using bedrails? 2  -CM     Moving from lying on back to sitting on the side of a flat bed without bedrails? 2  -CM     Moving to and from a bed to a chair (including a wheelchair)? 3  -CM     Standing up from a chair using your arms (e.g., wheelchair, bedside chair)? 3  -CM     Climbing 3-5 steps with a railing? 2  -CM     To walk in hospital room? 3  -CM     AM-PAC 6 Clicks Score (PT) 15  -CM     Highest level of mobility 4 --> Transferred to chair/commode  -CM       Row Name 08/10/23 1316 08/10/23 0907       Functional Assessment    Outcome Measure Options AM-PAC 6 Clicks Daily Activity (OT)  -JY AM-PAC 6 Clicks Basic Mobility (PT)  -CM              User Key  (r) = Recorded By, (t) = Taken By, (c) = Cosigned By      Initials Name Provider Type    Olga Castro OT Occupational Therapist    CM Shwetha Ferro, PT Physical Therapist                    Occupational Therapy Education       Title: PT OT SLP Therapies (In Progress)       Topic: Occupational Therapy (In Progress)       Point: ADL training (In Progress)       Description:   Instruct learner(s) on proper safety adaptation and remediation techniques during self care or transfers.   Instruct in proper use of assistive devices.                  Learning  Progress Summary             Patient Acceptance, E,D, NR by QUEENIE at 8/10/2023 1017                         Point: Home exercise program (Not Started)       Description:   Instruct learner(s) on appropriate technique for monitoring, assisting and/or progressing therapeutic exercises/activities.                  Learner Progress:  Not documented in this visit.              Point: Precautions (In Progress)       Description:   Instruct learner(s) on prescribed precautions during self-care and functional transfers.                  Learning Progress Summary             Patient Acceptance, E,D, NR by QUEENIE at 8/10/2023 1017                         Point: Body mechanics (In Progress)       Description:   Instruct learner(s) on proper positioning and spine alignment during self-care, functional mobility activities and/or exercises.                  Learning Progress Summary             Patient Acceptance, E,D, NR by QUEENIE at 8/10/2023 1017                                         User Key       Initials Effective Dates Name Provider Type Discipline    QUEENIE 06/16/21 -  Olga Eng OT Occupational Therapist OT                  OT Recommendation and Plan  Planned Therapy Interventions (OT): activity tolerance training, adaptive equipment training, BADL retraining, functional balance retraining, occupation/activity based interventions, patient/caregiver education/training, ROM/therapeutic exercise, strengthening exercise, transfer/mobility retraining  Therapy Frequency (OT): daily  Plan of Care Review  Plan of Care Reviewed With: patient  Progress: no change (OT IE)  Outcome Evaluation: OT evaluation completed. Postoperatively, pt presents with decreased I in ADLs, related t/fs, mobility limited by decreased activity tolerance, impaired balance, muscle weakness at BUEs, decreased distal reach impacting LB ADLs and this date persistent nausea, dizziness and pain upward of 8/10. OT issued pt LH AE to facilitate more I and safe LB ADLs  while adhering to spinal precautions. Pt demonstrated improved performance in d/d socks with AE and anticipate greater gains with further training. Pt completed STS w/ CGA and FWW. Progressive mobility limited by pt's s/s, pt with lower BP, RN aware. Based on pt presentation this date pt is below occupational performance baseline and would benefit from IPOT POC and IRF at d/c fro best outcomes.     Time Calculation:   Evaluation Complexity (OT)  Review Occupational Profile/Medical/Therapy History Complexity: brief/low complexity  Assessment, Occupational Performance/Identification of Deficit Complexity: 1-3 performance deficits  Clinical Decision Making Complexity (OT): problem focused assessment/low complexity  Overall Complexity of Evaluation (OT): low complexity     Time Calculation- OT       Row Name 08/10/23 1321             Time Calculation- OT    OT Start Time 1017  -JY      OT Received On 08/10/23  -JY      OT Goal Re-Cert Due Date 08/20/23  -JY         Timed Charges    54132 - OT Self Care/Mgmt Minutes 14  -JY         Untimed Charges    OT Eval/Re-eval Minutes 48  -JY         Total Minutes    Timed Charges Total Minutes 14  -JY      Untimed Charges Total Minutes 48  -JY       Total Minutes 62  -JY                User Key  (r) = Recorded By, (t) = Taken By, (c) = Cosigned By      Initials Name Provider Type    Olga Castro OT Occupational Therapist                  Therapy Charges for Today       Code Description Service Date Service Provider Modifiers Qty    [id number]  OT EVAL LOW COMPLEXITY 4 8/10/2023 Olga Eng OT GO 1    [id number]  OT SELF CARE/MGMT/TRAIN EA 15 MIN 8/10/2023 Olga Eng OT GO 1                 Olga Eng OT  8/10/2023

## 2023-08-10 NOTE — PROGRESS NOTES
"Ortho Spine Progress Note     LOS: 1 day   Patient Care Team:  Provider, No Known as PCP - Anai Cleveland DO (Internal Medicine)    Subjective: Back is sore.  No unusual complaints.  Catheter is out.    Objective:   Vital Signs:  /54 (BP Location: Right arm, Patient Position: Standing)   Pulse 89   Temp 98.1 øF (36.7 øC) (Oral)   Resp 16   Ht 162.6 cm (64\")   Wt 96.5 kg (212 lb 11.9 oz)   SpO2 97%   BMI 36.52 kg/mý     Labs:  Lab Results (last 24 hours)       Procedure Component Value Units Date/Time    POC Glucose Once [[id number]]  (Abnormal) Collected: 08/10/23 0739    Specimen: Blood Updated: 08/10/23 0741     Glucose 215 mg/dL     Hemoglobin A1c [[id number]]  (Abnormal) Collected: 08/10/23 0540    Specimen: Blood Updated: 08/10/23 0633     Hemoglobin A1C 7.20 %     Narrative:      Hemoglobin A1C Ranges:    Increased Risk for Diabetes  5.7% to 6.4%  Diabetes                     >= 6.5%  Diabetic Goal                < 7.0%    Basic Metabolic Panel [[id number]]  (Abnormal) Collected: 08/10/23 0540    Specimen: Blood Updated: 08/10/23 0618     Glucose 190 mg/dL      BUN 18 mg/dL      Creatinine 1.06 mg/dL      Sodium 139 mmol/L      Potassium 4.3 mmol/L      Chloride 110 mmol/L      CO2 22.0 mmol/L      Calcium 7.8 mg/dL      BUN/Creatinine Ratio 17.0     Anion Gap 7.0 mmol/L      eGFR 56.3 mL/min/1.73     Narrative:      GFR Normal >60  Chronic Kidney Disease <60  Kidney Failure <15    The GFR formula is only valid for adults with stable renal function between ages 18 and 70.    CBC (No Diff) [[id number]]  (Abnormal) Collected: 08/10/23 0540    Specimen: Blood Updated: 08/10/23 0556     WBC 8.40 10*3/mm3      RBC 2.40 10*6/mm3      Hemoglobin 7.6 g/dL      Hematocrit 24.2 %      .8 fL      MCH 31.7 pg      MCHC 31.4 g/dL      RDW 13.1 %      RDW-SD 48.5 fl      MPV 10.9 fL      Platelets 141 10*3/mm3     POC Glucose Once [[id number]]  (Abnormal) Collected: 08/09/23 2104    Specimen: " Blood Updated: 08/09/23 2105     Glucose 218 mg/dL     POC Glucose Once [[id number]]  (Abnormal) Collected: 08/09/23 1926    Specimen: Blood Updated: 08/09/23 1927     Glucose 239 mg/dL     POC Glucose Once [[id number]]  (Abnormal) Collected: 08/09/23 1758    Specimen: Blood Updated: 08/09/23 1800     Glucose 236 mg/dL     POC Glucose Once [[id number]]  (Abnormal) Collected: 08/09/23 1451    Specimen: Blood Updated: 08/09/23 1452     Glucose 318 mg/dL      Comment: Meter: [id number] : [id number] Jose Heike F               Awake, alert, oriented.  Sitting up in chair.  Motor intact lower extremities.    Assessment/Plan: Continue physical therapy/mobilization.  Medical management per Dr. BOLAÑOS  If progresses well may be home tomorrow.    Darío Pickering MD  08/10/23  10:09 EDT

## 2023-08-10 NOTE — PLAN OF CARE
Goal Outcome Evaluation:  Plan of Care Reviewed With: patient        Progress: no change (OT IE)  Outcome Evaluation: OT evaluation completed. Postoperatively, pt presents with decreased I in ADLs, related t/fs, mobility limited by decreased activity tolerance, impaired balance, muscle weakness at BUEs, decreased distal reach impacting LB ADLs and this date persistent nausea, dizziness and pain upward of 8/10. OT issued pt LH AE to facilitate more I and safe LB ADLs while adhering to spinal precautions. Pt demonstrated improved performance in d/d socks with AE and anticipate greater gains with further training. Pt completed STS w/ CGA and FWW. Progressive mobility limited by pt's s/s, pt with lower BP, RN aware. Based on pt presentation this date pt is below occupational performance baseline and would benefit from IPOT POC and IRF at d/c fro best outcomes.      Anticipated Discharge Disposition (OT): inpatient rehabilitation facility   no

## 2023-08-10 NOTE — PROGRESS NOTES
"IM progress note      Luna Mark  [id number]  1950     LOS: 1 day     Attending: Darío Pickering MD    Primary Care Provider: Provider, No Known      Chief Complaint/Reason for visit:  back pain    Subjective   Doing okay.  Pain 8 out of 10 this morning but had just received pain medication.  Reports some mild nausea.  Denies  shortness of breath or chest pain.    Objective     Vital Signs  Visit Vitals  /65 (BP Location: Right arm, Patient Position: Standing)   Pulse 100   Temp 97.9 øF (36.6 øC) (Oral)   Resp 18   Ht 162.6 cm (64\")   Wt 96.5 kg (212 lb 11.9 oz)   SpO2 98%   BMI 36.52 kg/mý     Temp (24hrs), Av.8 øF (36.6 øC), Min:97.5 øF (36.4 øC), Max:98.1 øF (36.7 øC)      Nutrition: P.o.    Respiratory: RA    Physical Therapy: Plan of Care Reviewed With: patient  Outcome Evaluation: Patient presents with increased pain as well as generalized deficits in strength, endurance, and balance. She ambulated 20' CGAx1+1 for chair follow with FWW limited by both pain and nausea. IPPT is indicated to address current deficits. Recommend D/C to [facility] at this time as patient lives alone. Patient prefers D/C home, she will need to show improvement with mobility in order for this to be a safe discharge plan. Will continue to assess D/C plan as pain/nausea improves.        Anticipated Discharge Disposition (PT): inpatient rehabilitation facility    Physical Exam:     General Appearance:    Alert, cooperative, in no acute distress   Head:    Normocephalic, without obvious abnormality, atraumatic    Lungs:     Normal effort, symmetric chest rise, no crepitus, clear to      auscultation bilaterally             Heart:    Regular rhythm and normal rate, normal S1 and S2   Abdomen:     Normal bowel sounds, no masses, no organomegaly, soft        non-tender, non-distended, no guarding, no rebound                tenderness  Back : Surgical dressing CDI. Hemovac present    Extremities:   No clubbing, cyanosis or " edema.  No deformities.    Pulses:   Pulses palpable and equal bilaterally   Skin:   No bleeding, bruising or rash   Neurologic:   Moves all extremities with no obvious focal motor deficit.  Cranial nerves 2 - 12 grossly intact     Results Review:     I reviewed the patient's new clinical results.   Results from last 7 days   Lab Units 08/10/23  1300 08/10/23  0540   WBC 10*3/mm3  --  8.40   HEMOGLOBIN g/dL 8.1* 7.6*   HEMATOCRIT % 26.5* 24.2*   PLATELETS 10*3/mm3  --  141     Results from last 7 days   Lab Units 08/10/23  0540   SODIUM mmol/L 139   POTASSIUM mmol/L 4.3   CHLORIDE mmol/L 110*   CO2 mmol/L 22.0   BUN mg/dL 18   CREATININE mg/dL 1.06*   CALCIUM mg/dL 7.8*   GLUCOSE mg/dL 190*     I reviewed the patient's new imaging including images and reports.    All medications reviewed.   carvedilol, 25 mg, Oral, BID With Meals  famotidine, 20 mg, Intravenous, Q12H   Or  famotidine, 20 mg, Oral, Q12H  hydrOXYzine, 25 mg, Oral, 4x Daily  insulin lispro, 2-7 Units, Subcutaneous, 4x Daily AC & at Bedtime  lisinopril, 40 mg, Oral, Q24H  sodium chloride, 3 mL, Intravenous, Q12H        Assessment & Plan     S/P lumbar fusion    Back pain    Hypertension    Diabetes    Acute blood loss anemia      Plan  1. PT/OT-ambulate  2. Pain control-prns       3. IS-encourage  4. DVT proph- Mechs  5. Bowel regimen  6. Resume home medications as appropriate  7. Monitor post-op labs  8. DC planning for home, likely tomorrow     HTN  - Continue home coreg  - Monitor BP   - Holding parameters for BP meds  - Labetalol PRN for SBP>170     DM  - hgb A1c in AM  - Hold OHA for now  - Accu-Chek AC and HS with low dose SSI  -will add levemir 10 units tonight for better glucose control    Acute blood loss anemia  -Hemoglobin 7.6 this morning, recheck at noon improved 8.1  -Check CBC in a.m.    Electronically signed by ROBYN Brown, 08/10/23, 5:23 PM EDT.

## 2023-08-10 NOTE — PLAN OF CARE
Goal Outcome Evaluation:  Plan of Care Reviewed With: patient           Outcome Evaluation: Patient presents with increased pain as well as generalized deficits in strength, endurance, and balance. She ambulated 20' CGAx1+1 for chair follow with FWW limited by both pain and nausea. IPPT is indicated to address current deficits. Recommend D/C to [facility] at this time as patient lives alone. Patient prefers D/C home, she will need to show improvement with mobility in order for this to be a safe discharge plan. Will continue to assess D/C plan as pain/nausea improves.      Anticipated Discharge Disposition (PT): inpatient rehabilitation facility

## 2023-08-11 LAB
ANION GAP SERPL CALCULATED.3IONS-SCNC: 8 MMOL/L (ref 5–15)
BASOPHILS # BLD AUTO: 0.01 10*3/MM3 (ref 0–0.2)
BASOPHILS NFR BLD AUTO: 0.1 % (ref 0–1.5)
BUN SERPL-MCNC: 24 MG/DL (ref 8–23)
BUN/CREAT SERPL: 17.6 (ref 7–25)
CALCIUM SPEC-SCNC: 7.9 MG/DL (ref 8.6–10.5)
CHLORIDE SERPL-SCNC: 112 MMOL/L (ref 98–107)
CO2 SERPL-SCNC: 22 MMOL/L (ref 22–29)
CREAT SERPL-MCNC: 1.36 MG/DL (ref 0.57–1)
DEPRECATED RDW RBC AUTO: 50.6 FL (ref 37–54)
EGFRCR SERPLBLD CKD-EPI 2021: 41.7 ML/MIN/1.73
EOSINOPHIL # BLD AUTO: 0.02 10*3/MM3 (ref 0–0.4)
EOSINOPHIL NFR BLD AUTO: 0.2 % (ref 0.3–6.2)
ERYTHROCYTE [DISTWIDTH] IN BLOOD BY AUTOMATED COUNT: 13.6 % (ref 12.3–15.4)
GLUCOSE BLDC GLUCOMTR-MCNC: 120 MG/DL (ref 70–130)
GLUCOSE BLDC GLUCOMTR-MCNC: 159 MG/DL (ref 70–130)
GLUCOSE BLDC GLUCOMTR-MCNC: 193 MG/DL (ref 70–130)
GLUCOSE BLDC GLUCOMTR-MCNC: 197 MG/DL (ref 70–130)
GLUCOSE SERPL-MCNC: 111 MG/DL (ref 65–99)
HCT VFR BLD AUTO: 24.1 % (ref 34–46.6)
HGB BLD-MCNC: 7.4 G/DL (ref 12–15.9)
IMM GRANULOCYTES # BLD AUTO: 0.04 10*3/MM3 (ref 0–0.05)
IMM GRANULOCYTES NFR BLD AUTO: 0.4 % (ref 0–0.5)
LYMPHOCYTES # BLD AUTO: 0.58 10*3/MM3 (ref 0.7–3.1)
LYMPHOCYTES NFR BLD AUTO: 5.7 % (ref 19.6–45.3)
MCH RBC QN AUTO: 31.5 PG (ref 26.6–33)
MCHC RBC AUTO-ENTMCNC: 30.7 G/DL (ref 31.5–35.7)
MCV RBC AUTO: 102.6 FL (ref 79–97)
MONOCYTES # BLD AUTO: 1.27 10*3/MM3 (ref 0.1–0.9)
MONOCYTES NFR BLD AUTO: 12.5 % (ref 5–12)
NEUTROPHILS NFR BLD AUTO: 8.2 10*3/MM3 (ref 1.7–7)
NEUTROPHILS NFR BLD AUTO: 81.1 % (ref 42.7–76)
NRBC BLD AUTO-RTO: 0 /100 WBC (ref 0–0.2)
PLATELET # BLD AUTO: 156 10*3/MM3 (ref 140–450)
PMV BLD AUTO: 11 FL (ref 6–12)
POTASSIUM SERPL-SCNC: 5.2 MMOL/L (ref 3.5–5.2)
RBC # BLD AUTO: 2.35 10*6/MM3 (ref 3.77–5.28)
SODIUM SERPL-SCNC: 142 MMOL/L (ref 136–145)
WBC NRBC COR # BLD: 10.12 10*3/MM3 (ref 3.4–10.8)

## 2023-08-11 PROCEDURE — 63710000001 INSULIN DETEMIR PER 5 UNITS

## 2023-08-11 PROCEDURE — 86900 BLOOD TYPING SEROLOGIC ABO: CPT

## 2023-08-11 PROCEDURE — 97530 THERAPEUTIC ACTIVITIES: CPT

## 2023-08-11 PROCEDURE — 97110 THERAPEUTIC EXERCISES: CPT

## 2023-08-11 PROCEDURE — 63710000001 INSULIN LISPRO (HUMAN) PER 5 UNITS: Performed by: NURSE PRACTITIONER

## 2023-08-11 PROCEDURE — 85025 COMPLETE CBC W/AUTO DIFF WBC: CPT

## 2023-08-11 PROCEDURE — 86901 BLOOD TYPING SEROLOGIC RH(D): CPT

## 2023-08-11 PROCEDURE — 80048 BASIC METABOLIC PNL TOTAL CA: CPT

## 2023-08-11 PROCEDURE — 82948 REAGENT STRIP/BLOOD GLUCOSE: CPT

## 2023-08-11 RX ORDER — CARVEDILOL 12.5 MG/1
12.5 TABLET ORAL 2 TIMES DAILY WITH MEALS
Status: DISCONTINUED | OUTPATIENT
Start: 2023-08-11 | End: 2023-08-14 | Stop reason: HOSPADM

## 2023-08-11 RX ADMIN — LISINOPRIL 40 MG: 40 TABLET ORAL at 08:43

## 2023-08-11 RX ADMIN — SODIUM CHLORIDE 250 ML: 9 INJECTION, SOLUTION INTRAVENOUS at 11:49

## 2023-08-11 RX ADMIN — HYDROXYZINE HYDROCHLORIDE 25 MG: 25 TABLET, FILM COATED ORAL at 17:52

## 2023-08-11 RX ADMIN — HYDROCODONE BITARTRATE AND ACETAMINOPHEN 1 TABLET: 7.5; 325 TABLET ORAL at 22:47

## 2023-08-11 RX ADMIN — INSULIN LISPRO 2 UNITS: 100 INJECTION, SOLUTION INTRAVENOUS; SUBCUTANEOUS at 20:52

## 2023-08-11 RX ADMIN — INSULIN LISPRO 2 UNITS: 100 INJECTION, SOLUTION INTRAVENOUS; SUBCUTANEOUS at 17:51

## 2023-08-11 RX ADMIN — SODIUM CHLORIDE, POTASSIUM CHLORIDE, SODIUM LACTATE AND CALCIUM CHLORIDE 100 ML/HR: 600; 310; 30; 20 INJECTION, SOLUTION INTRAVENOUS at 10:15

## 2023-08-11 RX ADMIN — HYDROXYZINE HYDROCHLORIDE 25 MG: 25 TABLET, FILM COATED ORAL at 08:43

## 2023-08-11 RX ADMIN — INSULIN DETEMIR 10 UNITS: 100 INJECTION, SOLUTION SUBCUTANEOUS at 19:47

## 2023-08-11 RX ADMIN — Medication 3 ML: at 08:46

## 2023-08-11 RX ADMIN — FAMOTIDINE 20 MG: 20 TABLET ORAL at 08:46

## 2023-08-11 RX ADMIN — HYDROXYZINE HYDROCHLORIDE 25 MG: 25 TABLET, FILM COATED ORAL at 19:47

## 2023-08-11 RX ADMIN — HYDROCODONE BITARTRATE AND ACETAMINOPHEN 1 TABLET: 7.5; 325 TABLET ORAL at 06:14

## 2023-08-11 RX ADMIN — SODIUM CHLORIDE, POTASSIUM CHLORIDE, SODIUM LACTATE AND CALCIUM CHLORIDE 100 ML/HR: 600; 310; 30; 20 INJECTION, SOLUTION INTRAVENOUS at 01:00

## 2023-08-11 RX ADMIN — INSULIN LISPRO 2 UNITS: 100 INJECTION, SOLUTION INTRAVENOUS; SUBCUTANEOUS at 11:49

## 2023-08-11 RX ADMIN — HYDROXYZINE HYDROCHLORIDE 25 MG: 25 TABLET, FILM COATED ORAL at 11:50

## 2023-08-11 RX ADMIN — OXYCODONE HYDROCHLORIDE AND ACETAMINOPHEN 1 TABLET: 10; 325 TABLET ORAL at 08:45

## 2023-08-11 RX ADMIN — CARVEDILOL 25 MG: 12.5 TABLET, FILM COATED ORAL at 08:43

## 2023-08-11 RX ADMIN — FAMOTIDINE 20 MG: 20 TABLET ORAL at 19:47

## 2023-08-11 RX ADMIN — OXYCODONE HYDROCHLORIDE AND ACETAMINOPHEN 1 TABLET: 10; 325 TABLET ORAL at 17:51

## 2023-08-11 NOTE — PLAN OF CARE
Goal Outcome Evaluation:  Plan of Care Reviewed With: patient        Progress: improving     Pt is alert and oriented x4. Vss on 2L NC. Pt has slept off and on throughout the shift. PRN norco given as ordered for pain. Dressing to back is CDI. Hemovac drain had 90mL of bloody/serosanguinous drainage out this shift. Pt is up with an assist x1-2, gait belt, and walker; ambulated in the room and to the bathroom. Voided 100mL and post void was 22mL. Bladder scanned around 0400 with 85mL. Pt received 1000mL bolus at the beginning of the shift and is receiving maintenance fluids. Will continue to monitor.

## 2023-08-11 NOTE — PROGRESS NOTES
"Ortho Spine Progress Note     LOS: 2 days   Patient Care Team:  Provider, No Known as PCP - Anai Cleveland DO (Internal Medicine)    Subjective: Complains of back being sore.  No leg pain or numbness.  No unusual complaints.    Objective:   Vital Signs:  /75   Pulse 105   Temp 98.8 øF (37.1 øC) (Oral)   Resp 18   Ht 162.6 cm (64\")   Wt 96.5 kg (212 lb 11.9 oz)   SpO2 94%   BMI 36.52 kg/mý     Labs:  Lab Results (last 24 hours)       Procedure Component Value Units Date/Time    POC Glucose Once [[id number]]  (Normal) Collected: 08/11/23 0738    Specimen: Blood Updated: 08/11/23 0739     Glucose 120 mg/dL     Basic Metabolic Panel [[id number]]  (Abnormal) Collected: 08/11/23 0333    Specimen: Blood Updated: 08/11/23 0537     Glucose 111 mg/dL      BUN 24 mg/dL      Creatinine 1.36 mg/dL      Sodium 142 mmol/L      Potassium 5.2 mmol/L      Chloride 112 mmol/L      CO2 22.0 mmol/L      Calcium 7.9 mg/dL      BUN/Creatinine Ratio 17.6     Anion Gap 8.0 mmol/L      eGFR 41.7 mL/min/1.73     Narrative:      GFR Normal >60  Chronic Kidney Disease <60  Kidney Failure <15    The GFR formula is only valid for adults with stable renal function between ages 18 and 70.    CBC & Differential [[id number]]  (Abnormal) Collected: 08/11/23 0333    Specimen: Blood Updated: 08/11/23 0452    Narrative:      The following orders were created for panel order CBC & Differential.  Procedure                               Abnormality         Status                     ---------                               -----------         ------                     CBC Auto Differential[[id number]]        Abnormal            Final result                 Please view results for these tests on the individual orders.    CBC Auto Differential [[id number]]  (Abnormal) Collected: 08/11/23 0333    Specimen: Blood Updated: 08/11/23 0452     WBC 10.12 10*3/mm3      RBC 2.35 10*6/mm3      Hemoglobin 7.4 g/dL      Hematocrit 24.1 %      MCV " 102.6 fL      MCH 31.5 pg      MCHC 30.7 g/dL      RDW 13.6 %      RDW-SD 50.6 fl      MPV 11.0 fL      Platelets 156 10*3/mm3      Neutrophil % 81.1 %      Lymphocyte % 5.7 %      Monocyte % 12.5 %      Eosinophil % 0.2 %      Basophil % 0.1 %      Immature Grans % 0.4 %      Neutrophils, Absolute 8.20 10*3/mm3      Lymphocytes, Absolute 0.58 10*3/mm3      Monocytes, Absolute 1.27 10*3/mm3      Eosinophils, Absolute 0.02 10*3/mm3      Basophils, Absolute 0.01 10*3/mm3      Immature Grans, Absolute 0.04 10*3/mm3      nRBC 0.0 /100 WBC     POC Glucose Once [[id number]]  (Abnormal) Collected: 08/10/23 2055    Specimen: Blood Updated: 08/10/23 2058     Glucose 189 mg/dL     POC Glucose Once [[id number]]  (Abnormal) Collected: 08/10/23 1643    Specimen: Blood Updated: 08/10/23 1644     Glucose 256 mg/dL     Hemoglobin & Hematocrit, Blood [[id number]]  (Abnormal) Collected: 08/10/23 1300    Specimen: Blood Updated: 08/10/23 1309     Hemoglobin 8.1 g/dL      Hematocrit 26.5 %     POC Glucose Once [[id number]]  (Abnormal) Collected: 08/10/23 1145    Specimen: Blood Updated: 08/10/23 1147     Glucose 229 mg/dL             Awake, alert, oriented.  Motor intact lower extremities.    Assessment/Plan: Not independent in ambulation at this time.  She lives alone at home.  According to OT/PT notes patient may need inpatient rehab.  I will ask  to look into this.    Darío Pickering MD  08/11/23  10:42 EDT

## 2023-08-11 NOTE — PROGRESS NOTES
"IM progress note      Luna Mark  [id number]  1950     LOS: 2 days     Attending: Darío Pickering MD    Primary Care Provider: Provider, No Known      Chief Complaint/Reason for visit:  back pain    Subjective   Patient felt dizzy this morning with physical therapy.  Blood pressure 90 systolic.  She denies chest pain or palpitation.  No syncope or presyncope.  No diaphoresis.  Still having pain and difficulty with ambulation    Objective     Vital Signs  Visit Vitals  BP 99/47 (BP Location: Right arm, Patient Position: Lying)   Pulse 88   Temp 98.6 øF (37 øC) (Oral)   Resp 18   Ht 162.6 cm (64\")   Wt 96.5 kg (212 lb 11.9 oz)   SpO2 99%   BMI 36.52 kg/mý     Temp (24hrs), Av.9 øF (37.2 øC), Min:97.9 øF (36.6 øC), Max:100 øF (37.8 øC)      Nutrition: P.o.    Respiratory: RA    Physical Therapy: Plan of Care Reviewed With: patient  Outcome Evaluation: Patient presents with increased pain as well as generalized deficits in strength, endurance, and balance. She ambulated 20' CGAx1+1 for chair follow with FWW limited by both pain and nausea. IPPT is indicated to address current deficits. Recommend D/C to [facility] at this time as patient lives alone. Patient prefers D/C home, she will need to show improvement with mobility in order for this to be a safe discharge plan. Will continue to assess D/C plan as pain/nausea improves.        Anticipated Discharge Disposition (PT): inpatient rehabilitation facility    Physical Exam:     General Appearance:    Alert, cooperative, in no acute distress   Head:    Normocephalic, without obvious abnormality, atraumatic    Lungs:     Normal effort, symmetric chest rise, no crepitus, clear to      auscultation bilaterally             Heart:    Regular rhythm and normal rate, normal S1 and S2   Abdomen:     Normal bowel sounds, no masses, no organomegaly, soft        non-tender, non-distended, no guarding, no rebound                tenderness  Back : Surgical dressing CDI. " Hemovac present    Extremities:   No clubbing, cyanosis or edema.  No deformities.    Pulses:   Pulses palpable and equal bilaterally   Skin:   No bleeding, bruising or rash   Neurologic:   Moves all extremities with no obvious focal motor deficit.  Cranial nerves 2 - 12 grossly intact     Results Review:     I reviewed the patient's new clinical results.   Results from last 7 days   Lab Units 08/11/23  0333 08/10/23  1300 08/10/23  0540   WBC 10*3/mm3 10.12  --  8.40   HEMOGLOBIN g/dL 7.4* 8.1* 7.6*   HEMATOCRIT % 24.1* 26.5* 24.2*   PLATELETS 10*3/mm3 156  --  141       Results from last 7 days   Lab Units 08/11/23  0333 08/10/23  0540   SODIUM mmol/L 142 139   POTASSIUM mmol/L 5.2 4.3   CHLORIDE mmol/L 112* 110*   CO2 mmol/L 22.0 22.0   BUN mg/dL 24* 18   CREATININE mg/dL 1.36* 1.06*   CALCIUM mg/dL 7.9* 7.8*   GLUCOSE mg/dL 111* 190*       I reviewed the patient's new imaging including images and reports.    All medications reviewed.   carvedilol, 12.5 mg, Oral, BID With Meals  famotidine, 20 mg, Intravenous, Q12H   Or  famotidine, 20 mg, Oral, Q12H  hydrOXYzine, 25 mg, Oral, 4x Daily  insulin detemir, 10 Units, Subcutaneous, Nightly  insulin lispro, 2-7 Units, Subcutaneous, 4x Daily AC & at Bedtime  sodium chloride, 250 mL, Intravenous, Once  sodium chloride, 3 mL, Intravenous, Q12H        Assessment & Plan     S/P lumbar fusion    Back pain    Hypertension    Diabetes    Acute blood loss anemia      Plan  1. PT/OT-ambulate  2. Pain control-prns       3. IS-encourage  4. DVT proph- Mechs  5. Bowel regimen  6. Resume home medications as appropriate  7. Monitor post-op labs  8. DC planning for rehab     HTN  Currently patient with hypotension.  Will give saline bolus followed by saline infusion  Will discontinue lisinopril due to worsening renal function, might resume later when patient condition improved  Will decrease Coreg with hold parameter     DM  - hgb A1c in AM  - Hold OHA for now  - Accu-Chek AC and HS  with low dose SSI  Blood sugar improved with the addition of Levemir, continue to monitor    Acute blood loss anemia  Hematocrit reviewed, no indication for transfusion at this point in time and less patient remained hypotensive  We will follow-up in the morning    Plan to discharge to rehab facility    Electronically signed by ROBYN Brown, 08/10/23, 5:23 PM EDT.

## 2023-08-11 NOTE — THERAPY TREATMENT NOTE
Patient Name: Luna Mark  : 1950    MRN: [id number]                              Today's Date: 2023       Admit Date: 2023    Visit Dx: No diagnosis found.  Patient Active Problem List   Diagnosis    Back pain    S/P lumbar fusion    Hypertension    Diabetes    Acute blood loss anemia     Past Medical History:   Diagnosis Date    Diabetes mellitus     H/O skin graft     Hypertension     UTI (urinary tract infection)      Past Surgical History:   Procedure Laterality Date    COLONOSCOPY      LUMBAR DISCECTOMY FUSION INSTRUMENTATION N/A 2023    Procedure: LAMINECTOMY SPINAL FUSION L3-4, L4-5;  Surgeon: Darío Pickering MD;  Location: [facility];  Service: Orthopedic Spine;  Laterality: N/A;    SKIN GRAFT      BILATERAL HANDS AND ABDOMEN      General Information       Row Name 23 132          Physical Therapy Time and Intention    Document Type therapy note (daily note)  -CM     Mode of Treatment individual therapy;physical therapy  -CM       Row Name 23 132          General Information    Patient Profile Reviewed yes  -CM     Existing Precautions/Restrictions fall;spinal;other (see comments);orthostatic hypotension  hemovac, monitor BP  -CM     Barriers to Rehab medically complex  -CM       Row Name 23 132          Cognition    Orientation Status (Cognition) oriented x 4  -CM       Row Name 23 132          Safety Issues, Functional Mobility    Safety Issues Affecting Function (Mobility) insight into deficits/self-awareness;safety precaution awareness;sequencing abilities  -CM     Impairments Affecting Function (Mobility) balance;endurance/activity tolerance;pain;postural/trunk control;strength  -CM               User Key  (r) = Recorded By, (t) = Taken By, (c) = Cosigned By      Initials Name Provider Type    CM Shwetha Ferro PT Physical Therapist                   Mobility       Row Name 23 1328          Bed Mobility    Bed Mobility supine-sit  -CM      Supine-Sit [place] (Bed Mobility) moderate assist (50% patient effort)  -CM     Assistive Device (Bed Mobility) bed rails  -CM     Comment, (Bed Mobility) patient verbalized 2/3 spinal precautions, reviewed logroll technqiue, patient still requiring verbal cues and assist to complete. She reports dizziness at EOB and her sitting BP was 111/51 at EOB  -CM       Row Name 08/11/23 1328          Bed-Chair Transfer    Bed-Chair [place] (Transfers) minimum assist (75% patient effort);1 person assist;verbal cues  -CM     Assistive Device (Bed-Chair Transfers) walker, front-wheeled  -CM     Comment, (Bed-Chair Transfer) bed > bedside commode and then bedside commode >chair. Cues for sequencing with AD and [employer] with balance. Patient able to take sidesteps, cues provided for upright posture. Upon sitting in chair, BP is 84/36 with patient in reclined position, RN was notified and present post treatment  -CM       Row Name 08/11/23 1328          Sit-Stand Transfer    Sit-Stand [place] (Transfers) minimum assist (75% patient effort);verbal cues;1 person assist  -CM     Assistive Device (Sit-Stand Transfers) walker, front-wheeled  -CM     Comment, (Sit-Stand Transfer) cues for safe hand placement, dizziness is persistent with transfers, but patient denies an increase in intensity  -CM       Row Name 08/11/23 1328          Gait/Stairs (Locomotion)    [place] Level (Gait) unable to assess  -CM     Comment, (Gait/Stairs) not appropriate due to hypotension  -CM               User Key  (r) = Recorded By, (t) = Taken By, (c) = Cosigned By      Initials Name Provider Type    Shwetha Dailey, PT Physical Therapist                   Obj/Interventions       Row Name 08/11/23 1335          Motor Skills    Therapeutic Exercise hip;ankle;knee  -CM       Row Name 08/11/23 1332          Hip (Therapeutic Exercise)    Hip (Therapeutic Exercise) isometric exercises  -CM     Hip Isometrics (Therapeutic Exercise)  bilateral;gluteal sets;10 repetitions;3 second hold  -CM       Row Name 08/11/23 1332          Knee (Therapeutic Exercise)    Knee (Therapeutic Exercise) isometric exercises  -CM     Knee Isometrics (Therapeutic Exercise) bilateral;quad sets;10 repetitions;3 second hold  -CM       Row Name 08/11/23 1332          Ankle (Therapeutic Exercise)    Ankle (Therapeutic Exercise) AROM (active range of motion)  -CM     Ankle AROM (Therapeutic Exercise) bilateral;dorsiflexion;plantarflexion;10 repetitions  -CM       Row Name 08/11/23 1332          Balance    Balance Assessment sitting static balance;standing static balance;standing dynamic balance  -CM     Static Sitting Balance contact guard  -CM     Position, Sitting Balance unsupported;sitting edge of bed  -CM     Static Standing Balance contact guard  -CM     Dynamic Standing Balance minimal assist  -CM     Position/Device Used, Standing Balance supported;walker, front-wheeled  -CM     Comment, Balance general unsteadiness on feet although no overt LOB  -CM               User Key  (r) = Recorded By, (t) = Taken By, (c) = Cosigned By      Initials Name Provider Type    CM Shwetha Ferro, PT Physical Therapist                   Goals/Plan    No documentation.                  Clinical Impression       Row Name 08/11/23 1333          Pain    Pretreatment Pain Rating 4/10  -CM     Posttreatment Pain Rating 5/10  -CM     Pain Location - Side/Orientation Bilateral  -CM     Pain Location generalized  -CM     Pain Location - back  -CM     Pain Intervention(s) Ambulation/increased activity;Repositioned  -CM       Row Name 08/11/23 1333          Plan of Care Review    Plan of Care Reviewed With patient  -CM     Progress declining  -CM     Outcome Evaluation Patient with improved pain/nausea during today's sessio, however was limited by hypotension and persistent dizziness with mobility, RN aware. Still requiring min/modA for bed mobility and transfers. Spinal precautions and  HEP reviewed as appropriate. IPPT will continue to progress as appropriate. Continue to recommend D/C to IPR.  -CM       Row Name 08/11/23 1333          Vital Signs    Pre Systolic BP Rehab 99  -CM     Pre Treatment Diastolic BP 47  -CM     Intra Systolic BP Rehab 111  -CM     Intra Treatment Diastolic BP 51  -CM     Post Systolic BP Rehab 82   reclined in chair, RN present and aware  -CM     Post Treatment Diastolic BP 36   -CM     Pretreatment Heart Rate (beats/min) 85  -CM     Posttreatment Heart Rate (beats/min) 82  -CM     Pre SpO2 (%) 98  -CM     O2 Delivery Pre Treatment nasal cannula  -CM     Intra SpO2 (%) 94  -CM     O2 Delivery Intra Treatment room air  -CM     Post SpO2 (%) 99  -CM     O2 Delivery Post Treatment nasal cannula  -CM     Pre Patient Position Supine  -CM     Intra Patient Position Sitting  -CM     Post Patient Position Sitting  -CM       Row Name 08/11/23 1333          Positioning and Restraints    Pre-Treatment Position in bed  -CM     Post Treatment Position chair  -CM     In Chair reclined;call light within reach;encouraged to call for assist;exit alarm on;on mechanical lift sling;waffle cushion;compression device;notified nsg;with nsg  -CM               User Key  (r) = Recorded By, (t) = Taken By, (c) = Cosigned By      Initials Name Provider Type    Shwetha Dailey, PT Physical Therapist                   Outcome Measures       Row Name 08/11/23 1336 08/11/23 0800       How much help from another person do you currently need...    Turning from your back to your side while in flat bed without using bedrails? 2  -CM 2  -AC    Moving from lying on back to sitting on the side of a flat bed without bedrails? 2  -CM 2  -AC    Moving to and from a bed to a chair (including a wheelchair)? 3  -CM 3  -AC    Standing up from a chair using your arms (e.g., wheelchair, bedside chair)? 3  -CM 3  -AC    Climbing 3-5 steps with a railing? 2  -CM 2  -AC    To walk in hospital room? 2  -CM 3  -AC     [facility] 6 Clicks Score (PT) 14  -CM 15  -AC    Highest level of mobility 4 --> Transferred to chair/commode  -CM 4 --> Transferred to chair/commode  -AC      Row Name 08/11/23 1336          Functional Assessment    Outcome Measure Options AM-PAC 6 Clicks Basic Mobility (PT)  -CM               User Key  (r) = Recorded By, (t) = Taken By, (c) = Cosigned By      Initials Name Provider Type    AC Angie Ventura RN Registered Nurse    Shwetha Dailey, PT Physical Therapist                                 Physical Therapy Education       Title: PT OT SLP Therapies (In Progress)       Topic: Physical Therapy (Done)       Point: Mobility training (Done)       Learning Progress Summary             Patient Acceptance, E, VU by CM at 8/11/2023 1337    Acceptance, E, VU by CM at 8/10/2023 0908                         Point: Home exercise program (Done)       Learning Progress Summary             Patient Acceptance, E, VU by CM at 8/11/2023 1337    Acceptance, E, VU by CM at 8/10/2023 0908                         Point: Body mechanics (Done)       Learning Progress Summary             Patient Acceptance, E, VU by CM at 8/11/2023 1337    Acceptance, E, VU by CM at 8/10/2023 0908                         Point: Precautions (Done)       Learning Progress Summary             Patient Acceptance, E, VU by CM at 8/11/2023 1337    Acceptance, E, VU by CM at 8/10/2023 0908                                         User Key       Initials Effective Dates Name Provider Type Discipline     09/22/22 -  Shwetha Ferro, GHADA Physical Therapist PT                  PT Recommendation and Plan  Planned Therapy Interventions (PT): balance training, bed mobility training, gait training, home exercise program, stretching, strengthening, stair training, ROM (range of motion), postural re-education, transfer training, patient/family education  Plan of Care Reviewed With: patient  Progress: declining  Outcome Evaluation: Patient with improved  pain/nausea during today's sessio, however was limited by hypotension and persistent dizziness with mobility, RN aware. Still requiring min/modA for bed mobility and transfers. Spinal precautions and HEP reviewed as appropriate. IPPT will continue to progress as appropriate. Continue to recommend D/C to IPR.     Time Calculation:   PT Evaluation Complexity  History, PT Evaluation Complexity: 1-2 personal factors and/or comorbidities  Examination of Body Systems (PT Eval Complexity): total of 3 or more elements  Clinical Presentation (PT Evaluation Complexity): evolving  Clinical Decision Making (PT Evaluation Complexity): low complexity  Overall Complexity (PT Evaluation Complexity): low complexity     PT Charges       Row Name 08/11/23 1337             Time Calculation    Start Time 1106  -CM      PT Received On 08/11/23  -CM      PT Goal Re-Cert Due Date 08/20/23  -CM         Timed Charges    41327 - PT Therapeutic Exercise Minutes 10  -CM      19453 - PT Therapeutic Activity Minutes 20  -CM         Total Minutes    Timed Charges Total Minutes 30  -CM       Total Minutes 30  -CM                User Key  (r) = Recorded By, (t) = Taken By, (c) = Cosigned By      Initials Name Provider Type    CM Shwetha Ferro, PT Physical Therapist                  Therapy Charges for Today       Code Description Service Date Service Provider Modifiers Qty    35018794947 HC PT THER PROC EA 15 MIN 8/10/2023 Shwetha Ferro, PT GP 1    31727147265 HC PT EVAL LOW COMPLEXITY 4 8/10/2023 Shwetha Ferro, PT GP 1    82087871811 HC PT THER SUPP EA 15 MIN 8/10/2023 Shwetha Ferro, PT GP 2    96842516037 HC PT THER PROC EA 15 MIN 8/11/2023 Shwetha Ferro, PT GP 1    01500138212 HC PT THERAPEUTIC ACT EA 15 MIN 8/11/2023 Shwetha Ferro, PT GP 1            PT G-Codes  Outcome Measure Options: AM-PAC 6 Clicks Basic Mobility (PT)  AM-PAC 6 Clicks Score (PT): 14  AM-PAC 6 Clicks Score (OT): 15  PT Discharge  Summary  Anticipated Discharge Disposition (PT): inpatient rehabilitation facility    Shwetha Ferro, PT  8/11/2023

## 2023-08-11 NOTE — CASE MANAGEMENT/SOCIAL WORK
Continued Stay Note  [facility]     Patient Name: Luna Mark  MRN: [id number]  Today's Date: 8/11/2023    Admit Date: 8/9/2023    Plan: IPR   Discharge Plan       Row Name 08/11/23 1443       Plan    Plan IPR    Patient/Family in Agreement with Plan yes    Plan Comments I have met with  at the bedside today to discuss PT/OT recommendations for inpatient rehab.  She is agreeable to inpatient rehab and requests a referral to be submitted to [facility].  I have submitted this referral and confirmed with Rosario at [facility] that she will review.  CM will cont to follow the plan of care and asssit with discharge needs as recommendations become available.    Final Discharge Disposition Code 62 - inpatient rehab facility                   Discharge Codes    No documentation.                       Kathleen Buck RN

## 2023-08-11 NOTE — PLAN OF CARE
Goal Outcome Evaluation:  Plan of Care Reviewed With: patient        Progress: declining  Outcome Evaluation: Patient with improved pain/nausea during today's sessio, however was limited by hypotension and persistent dizziness with mobility, RN aware. Still requiring min/modA for bed mobility and transfers. Spinal precautions and HEP reviewed as appropriate. IPPT will continue to progress as appropriate. Continue to recommend D/C to IPR.      Anticipated Discharge Disposition (PT): inpatient rehabilitation facility

## 2023-08-12 PROBLEM — G89.18 ACUTE POSTOPERATIVE PAIN: Status: ACTIVE | Noted: 2023-08-12

## 2023-08-12 LAB
ABO GROUP BLD: NORMAL
ABO GROUP BLD: NORMAL
ANION GAP SERPL CALCULATED.3IONS-SCNC: 7 MMOL/L (ref 5–15)
BASOPHILS # BLD AUTO: 0.02 10*3/MM3 (ref 0–0.2)
BASOPHILS NFR BLD AUTO: 0.3 % (ref 0–1.5)
BLD GP AB SCN SERPL QL: NEGATIVE
BUN SERPL-MCNC: 17 MG/DL (ref 8–23)
BUN/CREAT SERPL: 17.5 (ref 7–25)
CALCIUM SPEC-SCNC: 8.2 MG/DL (ref 8.6–10.5)
CHLORIDE SERPL-SCNC: 111 MMOL/L (ref 98–107)
CO2 SERPL-SCNC: 23 MMOL/L (ref 22–29)
CREAT SERPL-MCNC: 0.97 MG/DL (ref 0.57–1)
DEPRECATED RDW RBC AUTO: 49.6 FL (ref 37–54)
EGFRCR SERPLBLD CKD-EPI 2021: 62.6 ML/MIN/1.73
EOSINOPHIL # BLD AUTO: 0.09 10*3/MM3 (ref 0–0.4)
EOSINOPHIL NFR BLD AUTO: 1.2 % (ref 0.3–6.2)
ERYTHROCYTE [DISTWIDTH] IN BLOOD BY AUTOMATED COUNT: 13.3 % (ref 12.3–15.4)
GLUCOSE BLDC GLUCOMTR-MCNC: 103 MG/DL (ref 70–130)
GLUCOSE BLDC GLUCOMTR-MCNC: 140 MG/DL (ref 70–130)
GLUCOSE BLDC GLUCOMTR-MCNC: 162 MG/DL (ref 70–130)
GLUCOSE BLDC GLUCOMTR-MCNC: 83 MG/DL (ref 70–130)
GLUCOSE SERPL-MCNC: 78 MG/DL (ref 65–99)
HCT VFR BLD AUTO: 21.8 % (ref 34–46.6)
HGB BLD-MCNC: 6.7 G/DL (ref 12–15.9)
IMM GRANULOCYTES # BLD AUTO: 0.03 10*3/MM3 (ref 0–0.05)
IMM GRANULOCYTES NFR BLD AUTO: 0.4 % (ref 0–0.5)
LYMPHOCYTES # BLD AUTO: 0.64 10*3/MM3 (ref 0.7–3.1)
LYMPHOCYTES NFR BLD AUTO: 8.3 % (ref 19.6–45.3)
MCH RBC QN AUTO: 30.9 PG (ref 26.6–33)
MCHC RBC AUTO-ENTMCNC: 30.7 G/DL (ref 31.5–35.7)
MCV RBC AUTO: 100.5 FL (ref 79–97)
MONOCYTES # BLD AUTO: 0.78 10*3/MM3 (ref 0.1–0.9)
MONOCYTES NFR BLD AUTO: 10.1 % (ref 5–12)
NEUTROPHILS NFR BLD AUTO: 6.13 10*3/MM3 (ref 1.7–7)
NEUTROPHILS NFR BLD AUTO: 79.7 % (ref 42.7–76)
NRBC BLD AUTO-RTO: 0 /100 WBC (ref 0–0.2)
PLATELET # BLD AUTO: 153 10*3/MM3 (ref 140–450)
PMV BLD AUTO: 11 FL (ref 6–12)
POTASSIUM SERPL-SCNC: 4.1 MMOL/L (ref 3.5–5.2)
RBC # BLD AUTO: 2.17 10*6/MM3 (ref 3.77–5.28)
RH BLD: POSITIVE
RH BLD: POSITIVE
SODIUM SERPL-SCNC: 141 MMOL/L (ref 136–145)
T&S EXPIRATION DATE: NORMAL
WBC NRBC COR # BLD: 7.69 10*3/MM3 (ref 3.4–10.8)

## 2023-08-12 PROCEDURE — 63710000001 INSULIN LISPRO (HUMAN) PER 5 UNITS: Performed by: NURSE PRACTITIONER

## 2023-08-12 PROCEDURE — P9016 RBC LEUKOCYTES REDUCED: HCPCS

## 2023-08-12 PROCEDURE — 97530 THERAPEUTIC ACTIVITIES: CPT

## 2023-08-12 PROCEDURE — 86900 BLOOD TYPING SEROLOGIC ABO: CPT

## 2023-08-12 PROCEDURE — 36430 TRANSFUSION BLD/BLD COMPNT: CPT

## 2023-08-12 PROCEDURE — 63710000001 INSULIN DETEMIR PER 5 UNITS

## 2023-08-12 PROCEDURE — 97110 THERAPEUTIC EXERCISES: CPT

## 2023-08-12 PROCEDURE — 86923 COMPATIBILITY TEST ELECTRIC: CPT

## 2023-08-12 PROCEDURE — 80048 BASIC METABOLIC PNL TOTAL CA: CPT | Performed by: INTERNAL MEDICINE

## 2023-08-12 PROCEDURE — 86901 BLOOD TYPING SEROLOGIC RH(D): CPT | Performed by: NURSE PRACTITIONER

## 2023-08-12 PROCEDURE — 86900 BLOOD TYPING SEROLOGIC ABO: CPT | Performed by: NURSE PRACTITIONER

## 2023-08-12 PROCEDURE — 85025 COMPLETE CBC W/AUTO DIFF WBC: CPT | Performed by: INTERNAL MEDICINE

## 2023-08-12 PROCEDURE — 82948 REAGENT STRIP/BLOOD GLUCOSE: CPT

## 2023-08-12 PROCEDURE — 86850 RBC ANTIBODY SCREEN: CPT | Performed by: NURSE PRACTITIONER

## 2023-08-12 RX ADMIN — CARVEDILOL 12.5 MG: 12.5 TABLET, FILM COATED ORAL at 17:30

## 2023-08-12 RX ADMIN — FAMOTIDINE 20 MG: 20 TABLET ORAL at 20:00

## 2023-08-12 RX ADMIN — HYDROXYZINE HYDROCHLORIDE 25 MG: 25 TABLET, FILM COATED ORAL at 20:00

## 2023-08-12 RX ADMIN — HYDROCODONE BITARTRATE AND ACETAMINOPHEN 1 TABLET: 7.5; 325 TABLET ORAL at 04:26

## 2023-08-12 RX ADMIN — BISACODYL 5 MG: 5 TABLET, COATED ORAL at 08:18

## 2023-08-12 RX ADMIN — CARVEDILOL 12.5 MG: 12.5 TABLET, FILM COATED ORAL at 08:18

## 2023-08-12 RX ADMIN — Medication 3 ML: at 08:18

## 2023-08-12 RX ADMIN — INSULIN DETEMIR 10 UNITS: 100 INJECTION, SOLUTION SUBCUTANEOUS at 21:30

## 2023-08-12 RX ADMIN — SODIUM CHLORIDE, POTASSIUM CHLORIDE, SODIUM LACTATE AND CALCIUM CHLORIDE 100 ML/HR: 600; 310; 30; 20 INJECTION, SOLUTION INTRAVENOUS at 04:26

## 2023-08-12 RX ADMIN — Medication 3 ML: at 19:59

## 2023-08-12 RX ADMIN — FAMOTIDINE 20 MG: 20 TABLET ORAL at 08:18

## 2023-08-12 RX ADMIN — HYDROXYZINE HYDROCHLORIDE 25 MG: 25 TABLET, FILM COATED ORAL at 08:18

## 2023-08-12 RX ADMIN — OXYCODONE HYDROCHLORIDE AND ACETAMINOPHEN 1 TABLET: 10; 325 TABLET ORAL at 14:34

## 2023-08-12 RX ADMIN — HYDROXYZINE HYDROCHLORIDE 25 MG: 25 TABLET, FILM COATED ORAL at 11:58

## 2023-08-12 RX ADMIN — HYDROXYZINE HYDROCHLORIDE 25 MG: 25 TABLET, FILM COATED ORAL at 17:30

## 2023-08-12 RX ADMIN — INSULIN LISPRO 2 UNITS: 100 INJECTION, SOLUTION INTRAVENOUS; SUBCUTANEOUS at 11:57

## 2023-08-12 RX ADMIN — OXYCODONE HYDROCHLORIDE AND ACETAMINOPHEN 1 TABLET: 10; 325 TABLET ORAL at 08:18

## 2023-08-12 NOTE — THERAPY TREATMENT NOTE
Patient Name: Luna Mark  : 1950    MRN: [id number]                              Today's Date: 2023       Admit Date: 2023    Visit Dx: No diagnosis found.  Patient Active Problem List   Diagnosis    Back pain    S/P lumbar fusion    Hypertension    Diabetes    Acute blood loss anemia     Past Medical History:   Diagnosis Date    Diabetes mellitus     H/O skin graft     Hypertension     UTI (urinary tract infection)      Past Surgical History:   Procedure Laterality Date    COLONOSCOPY      LUMBAR DISCECTOMY FUSION INSTRUMENTATION N/A 2023    Procedure: LAMINECTOMY SPINAL FUSION L3-4, L4-5;  Surgeon: Darío Pickering MD;  Location: [facility];  Service: Orthopedic Spine;  Laterality: N/A;    SKIN GRAFT      BILATERAL HANDS AND ABDOMEN      General Information       Row Name 23 1046          Physical Therapy Time and Intention    Document Type therapy note (daily note)  -KG     Mode of Treatment individual therapy;physical therapy  -KG       Row Name 23 1046          General Information    Patient Profile Reviewed yes  -KG     Existing Precautions/Restrictions fall;spinal;other (see comments);orthostatic hypotension  hemovac, monitor BP  -KG     Barriers to Rehab medically complex  -KG       Row Name 23 1046          Cognition    Orientation Status (Cognition) oriented x 4  -KG       Row Name 23 1046          Safety Issues, Functional Mobility    Impairments Affecting Function (Mobility) balance;endurance/activity tolerance;pain;postural/trunk control;strength  -KG               User Key  (r) = Recorded By, (t) = Taken By, (c) = Cosigned By      Initials Name Provider Type    KG Katja Sawyer Physical Therapist                   Mobility       Row Name 23 1047          Bed Mobility    Bed Mobility supine-sit  -KG     Supine-Sit [place] (Bed Mobility) moderate assist (50% patient effort)  -KG     Assistive Device (Bed Mobility) bed rails  -KG      Comment, (Bed Mobility) Pt verbalized dizziness, but /59. mod-A and extra time and effort required for log roll.  Reported increased dizziness while sitting, but 128/48 and pt declined attempts at standing  -KG       [facility] Name 08/12/23 1047          Transfers    Comment, (Transfers) pt declined due to dizziness  -KG               User Key  (r) = Recorded By, (t) = Taken By, (c) = Cosigned By      Initials Name Provider Type    Katja Shay Physical Therapist                   Obj/Interventions       Row Name 08/12/23 1126          Motor Skills    Therapeutic Exercise other (see comments)  heel slides, ankle pumps, quad sets, glut sets x15  -KG               User Key  (r) = Recorded By, (t) = Taken By, (c) = Cosigned By      Initials Name Provider Type    Katja Shay Physical Therapist                   Goals/Plan    No documentation.                  Clinical Impression       Row Name 08/12/23 1126          Pain    Pretreatment Pain Rating 5/10  -KG     Posttreatment Pain Rating 6/10  -KG     Pain Location lower  -KG     Pain Location - back  -KG     Pain Intervention(s) Repositioned;Ambulation/increased activity  -KG       Row Name 08/12/23 1126          Plan of Care Review    Plan of Care Reviewed With patient  -KG     Progress no change  -KG     Outcome Evaluation Pt c/o pain and dizziness.  /59. bed mobility required mod-A and extra time/ effort required for log roll. Reported increased dizziness while sitting, but 128/48 and pt declined attempts at standing.  Was motivated to also perform bed level exercises.  -KG       Row Name 08/12/23 1126          Vital Signs    Pre Systolic BP Rehab 139  -KG     Pre Treatment Diastolic BP 59  -KG     Intra Systolic BP Rehab 128  -KG     Intra Treatment Diastolic BP 48  -KG     Post Systolic BP Rehab 130  -KG     Post Treatment Diastolic BP 66  -KG       Row Name 08/12/23 1126          Positioning and Restraints    Pre-Treatment Position in bed   -KG     Post Treatment Position bed  -KG     In Bed notified nsg;fowlers;call light within reach;encouraged to call for assist;exit alarm on  -KG               User Key  (r) = Recorded By, (t) = Taken By, (c) = Cosigned By      Initials Name Provider Type    Katja Shay Physical Therapist                   Outcome Measures       Row Name 08/12/23 1129 08/12/23 0800       How much help from another person do you currently need...    Turning from your back to your side while in flat bed without using bedrails? 2  -KG 2  -AC    Moving from lying on back to sitting on the side of a flat bed without bedrails? 2  -KG 2  -AC    Moving to and from a bed to a chair (including a wheelchair)? 2  -KG 3  -AC    Standing up from a chair using your arms (e.g., wheelchair, bedside chair)? 3  -KG 3  -AC    Climbing 3-5 steps with a railing? 2  -KG 2  -AC    To walk in hospital room? 2  -KG 2  -AC    AM-PAC 6 Clicks Score (PT) 13  -KG 14  -AC    Highest level of mobility 4 --> Transferred to chair/commode  -KG 4 --> Transferred to chair/commode  -AC      Row Name 08/12/23 1129          Functional Assessment    Outcome Measure Options AM-PAC 6 Clicks Basic Mobility (PT)  -KG               User Key  (r) = Recorded By, (t) = Taken By, (c) = Cosigned By      Initials Name Provider Type    Angie Franklin RN Registered Nurse    Katja Shay Physical Therapist                                 Physical Therapy Education       Title: PT OT SLP Therapies (In Progress)       Topic: Physical Therapy (Done)       Point: Mobility training (Done)       Learning Progress Summary             Patient Acceptance, E, VU by KG at 8/12/2023 1129    Acceptance, E, VU by CM at 8/11/2023 1337    Acceptance, E, VU by CM at 8/10/2023 0908                         Point: Home exercise program (Done)       Learning Progress Summary             Patient Acceptance, E, VU by KG at 8/12/2023 1129    Acceptance, E, VU by CM at 8/11/2023 1337     Acceptance, E, VU by CM at 8/10/2023 0908                         Point: Body mechanics (Done)       Learning Progress Summary             Patient Acceptance, E, VU by KG at 8/12/2023 1129    Acceptance, E, VU by CM at 8/11/2023 1337    Acceptance, E, VU by CM at 8/10/2023 0908                         Point: Precautions (Done)       Learning Progress Summary             Patient Acceptance, E, VU by KG at 8/12/2023 1129    Acceptance, E, VU by CM at 8/11/2023 1337    Acceptance, E, VU by CM at 8/10/2023 0908                                         User Key       Initials Effective Dates Name Provider Type Discipline    KG 01/04/23 -  Katja Sawyer Physical Therapist PT    CM 09/22/22 -  Shwetha Ferro PT Physical Therapist PT                  PT Recommendation and Plan     Plan of Care Reviewed With: patient  Progress: no change  Outcome Evaluation: Pt c/o pain and dizziness.  /59. bed mobility required mod-A and extra time/ effort required for log roll. Reported increased dizziness while sitting, but 128/48 and pt declined attempts at standing.  Was motivated to also perform bed level exercises.     Time Calculation:         PT Charges       Row Name 08/12/23 1133             Time Calculation    Start Time 1022  -KG      PT Received On 08/12/23  -KG      PT Goal Re-Cert Due Date 08/20/23  -KG         Timed Charges    51310 - PT Therapeutic Exercise Minutes 10  -KG      65609 - PT Therapeutic Activity Minutes 15  -KG         Total Minutes    Timed Charges Total Minutes 25  -KG       Total Minutes 25  -KG                User Key  (r) = Recorded By, (t) = Taken By, (c) = Cosigned By      Initials Name Provider Type    KG Katja Sawyer Physical Therapist                  Therapy Charges for Today       Code Description Service Date Service Provider Modifiers Qty    33474072958 HC PT THER PROC EA 15 MIN 8/12/2023 Katja Sawyer GP 1    54919316406 HC PT THERAPEUTIC ACT EA 15 MIN 8/12/2023 [place]  Katja GP 1            PT G-Codes  Outcome Measure Options: AM-PAC 6 Clicks Basic Mobility (PT)  AM-PAC 6 Clicks Score (PT): 13  AM-PAC 6 Clicks Score (OT): 15       Katja Sawyer  8/12/2023

## 2023-08-12 NOTE — PROGRESS NOTES
"IM progress note      Luna Mark  [id number]  1950     LOS: 3 days     Attending: Darío Pickering MD    Primary Care Provider: Provider, No Known      Chief Complaint/Reason for visit:  back pain    Subjective   Doing well. Good pain control. Ambulating. Progressing with PT. Denies f/c/n/v/sob/cp.    Objective     Vital Signs  Visit Vitals  /60   Pulse 89   Temp 99.1 øF (37.3 øC) (Oral)   Resp 18   Ht 162.6 cm (64\")   Wt 96.5 kg (212 lb 11.9 oz)   SpO2 95%   BMI 36.52 kg/mý     Temp (24hrs), Av.7 øF (37.1 øC), Min:98 øF (36.7 øC), Max:99.9 øF (37.7 øC)      Nutrition: P.o.    Respiratory: RA    Physical Therapy: Pt c/o pain and dizziness. /59. bed mobility required mod-A and extra time/ effort required for log roll. Reported increased dizziness while sitting, but 128/48 and pt declined attempts at standing. Was motivated to also perform bed level exercises.        Physical Exam:     General Appearance:    Alert, cooperative, in no acute distress   Head:    Normocephalic, without obvious abnormality, atraumatic    Lungs:     Normal effort, symmetric chest rise, no crepitus, clear to      auscultation bilaterally             Heart:    Regular rhythm and normal rate, normal S1 and S2   Abdomen:     Normal bowel sounds, no masses, no organomegaly, soft        non-tender, non-distended, no guarding, no rebound                tenderness  Back : Surgical dressing CDI. Hemovac present    Extremities:   No clubbing, cyanosis or edema.  No deformities.    Pulses:   Pulses palpable and equal bilaterally   Skin:   No bleeding, bruising or rash   Neurologic:   Moves all extremities with no obvious focal motor deficit.  Cranial nerves 2 - 12 grossly intact     Results Review:     I reviewed the patient's new clinical results.   Results from last 7 days   Lab Units 23  0641 23  0333 08/10/23  1300 08/10/23  0540   WBC 10*3/mm3 7.69 10.12  --  8.40   HEMOGLOBIN g/dL 6.7* 7.4* 8.1* 7.6* "   HEMATOCRIT % 21.8* 24.1* 26.5* 24.2*   PLATELETS 10*3/mm3 153 156  --  141     Results from last 7 days   Lab Units 08/12/23  0641 08/11/23  0333 08/10/23  0540   SODIUM mmol/L 141 142 139   POTASSIUM mmol/L 4.1 5.2 4.3   CHLORIDE mmol/L 111* 112* 110*   CO2 mmol/L 23.0 22.0 22.0   BUN mg/dL 17 24* 18   CREATININE mg/dL 0.97 1.36* 1.06*   CALCIUM mg/dL 8.2* 7.9* 7.8*   GLUCOSE mg/dL 78 111* 190*      Latest Reference Range & Units 08/12/23 11:39 08/12/23 16:05   Glucose 70 - 130 mg/dL 162 (H) 103   (H): Data is abnormally high    I reviewed the patient's new imaging including images and reports.    All medications reviewed.   carvedilol, 12.5 mg, Oral, BID With Meals  famotidine, 20 mg, Intravenous, Q12H   Or  famotidine, 20 mg, Oral, Q12H  hydrOXYzine, 25 mg, Oral, 4x Daily  insulin detemir, 10 Units, Subcutaneous, Nightly  insulin lispro, 2-7 Units, Subcutaneous, 4x Daily AC & at Bedtime  sodium chloride, 3 mL, Intravenous, Q12H        Assessment & Plan     S/P lumbar fusion    Back pain    Hypertension    Diabetes    Acute blood loss anemia, 1 unti PRBC 8/12/23    Acute postoperative pain      Plan  1. PT/OT-ambulate  2. Pain control-prns       3. IS-encourage  4. DVT proph- [facility]  5. Bowel regimen  6. Resume home medications as appropriate  7. Monitor post-op labs  8. DC planning for rehab. CM following     HTN  - Continue home coreg  - Monitor BP   - Holding parameters for BP meds  - Labetalol PRN for SBP>170     DM  - hgb A1c in AM  - Hold OHA for now  - Accu-Chek AC and HS with low dose SSI  - will add levemir 10 units tonight for better glucose control    Acute blood loss anemia  - transfuse 1 unit PRBC 8/12/23  - H&H in am      ROBYN Marcus  8/12/23 @ 9132

## 2023-08-12 NOTE — PLAN OF CARE
Goal Outcome Evaluation:  Plan of Care Reviewed With: patient        Progress: no change  Outcome Evaluation: Pt c/o pain and dizziness.  /59. bed mobility required mod-A and extra time/ effort required for log roll. Reported increased dizziness while sitting, but 128/48 and pt declined attempts at standing.  Was motivated to also perform bed level exercises.

## 2023-08-12 NOTE — PLAN OF CARE
Goal Outcome Evaluation:  Plan of Care Reviewed With: patient        Progress: no change     Pt is alert and oriented x4. Vss on 1L NC. Pt has slept off and on throughout the shift. PRN norco given as ordered for pain. Dressing to back came loose and was changed using sterile technique; CDI. Hemovac drain had 160mL of serosanguinous drainage out this shift. Pt is up with an assist x1-2, gait belt, and walker to the bedside commode; voiding spontaneously. Anticipating d/c to rehab when medically ready. Will continue to monitor.

## 2023-08-13 LAB
BH BB BLOOD EXPIRATION DATE: NORMAL
BH BB BLOOD TYPE BARCODE: 1700
BH BB DISPENSE STATUS: NORMAL
BH BB PRODUCT CODE: NORMAL
BH BB UNIT NUMBER: NORMAL
CROSSMATCH INTERPRETATION: NORMAL
GLUCOSE BLDC GLUCOMTR-MCNC: 101 MG/DL (ref 70–130)
GLUCOSE BLDC GLUCOMTR-MCNC: 119 MG/DL (ref 70–130)
GLUCOSE BLDC GLUCOMTR-MCNC: 137 MG/DL (ref 70–130)
GLUCOSE BLDC GLUCOMTR-MCNC: 98 MG/DL (ref 70–130)
HCT VFR BLD AUTO: 23.1 % (ref 34–46.6)
HGB BLD-MCNC: 7.4 G/DL (ref 12–15.9)
UNIT  ABO: NORMAL
UNIT  RH: NORMAL

## 2023-08-13 PROCEDURE — 97110 THERAPEUTIC EXERCISES: CPT

## 2023-08-13 PROCEDURE — 97530 THERAPEUTIC ACTIVITIES: CPT

## 2023-08-13 PROCEDURE — 85018 HEMOGLOBIN: CPT | Performed by: NURSE PRACTITIONER

## 2023-08-13 PROCEDURE — 82948 REAGENT STRIP/BLOOD GLUCOSE: CPT

## 2023-08-13 PROCEDURE — 85014 HEMATOCRIT: CPT | Performed by: NURSE PRACTITIONER

## 2023-08-13 PROCEDURE — 63710000001 INSULIN DETEMIR PER 5 UNITS

## 2023-08-13 RX ADMIN — OXYCODONE HYDROCHLORIDE AND ACETAMINOPHEN 1 TABLET: 10; 325 TABLET ORAL at 05:04

## 2023-08-13 RX ADMIN — HYDROXYZINE HYDROCHLORIDE 25 MG: 25 TABLET, FILM COATED ORAL at 17:24

## 2023-08-13 RX ADMIN — HYDROCODONE BITARTRATE AND ACETAMINOPHEN 1 TABLET: 7.5; 325 TABLET ORAL at 15:11

## 2023-08-13 RX ADMIN — HYDROCODONE BITARTRATE AND ACETAMINOPHEN 1 TABLET: 7.5; 325 TABLET ORAL at 09:23

## 2023-08-13 RX ADMIN — DOCUSATE SODIUM 100 MG: 100 CAPSULE, LIQUID FILLED ORAL at 09:52

## 2023-08-13 RX ADMIN — CARVEDILOL 12.5 MG: 12.5 TABLET, FILM COATED ORAL at 08:08

## 2023-08-13 RX ADMIN — METHOCARBAMOL 1000 MG: 500 TABLET ORAL at 00:30

## 2023-08-13 RX ADMIN — HYDROCODONE BITARTRATE AND ACETAMINOPHEN 1 TABLET: 7.5; 325 TABLET ORAL at 20:47

## 2023-08-13 RX ADMIN — INSULIN DETEMIR 10 UNITS: 100 INJECTION, SOLUTION SUBCUTANEOUS at 20:48

## 2023-08-13 RX ADMIN — OXYCODONE HYDROCHLORIDE AND ACETAMINOPHEN 1 TABLET: 10; 325 TABLET ORAL at 00:30

## 2023-08-13 RX ADMIN — HYDROXYZINE HYDROCHLORIDE 25 MG: 25 TABLET, FILM COATED ORAL at 08:08

## 2023-08-13 RX ADMIN — METHOCARBAMOL 1000 MG: 500 TABLET ORAL at 08:17

## 2023-08-13 RX ADMIN — FAMOTIDINE 20 MG: 20 TABLET ORAL at 20:47

## 2023-08-13 RX ADMIN — HYDROXYZINE HYDROCHLORIDE 25 MG: 25 TABLET, FILM COATED ORAL at 20:48

## 2023-08-13 RX ADMIN — CARVEDILOL 12.5 MG: 12.5 TABLET, FILM COATED ORAL at 17:24

## 2023-08-13 RX ADMIN — BISACODYL 5 MG: 5 TABLET, COATED ORAL at 09:52

## 2023-08-13 RX ADMIN — FAMOTIDINE 20 MG: 20 TABLET ORAL at 08:08

## 2023-08-13 RX ADMIN — HYDROXYZINE HYDROCHLORIDE 25 MG: 25 TABLET, FILM COATED ORAL at 12:26

## 2023-08-13 RX ADMIN — Medication 3 ML: at 20:49

## 2023-08-13 NOTE — PLAN OF CARE
SBP remains moderately elevated. Border dressing CDI. Patient transfers to BSC with one assist, gait weak/unstable. Denies pain/discomfort at this time. Call light in reach.

## 2023-08-13 NOTE — PLAN OF CARE
Goal Outcome Evaluation:  Plan of Care Reviewed With: patient        Progress: improving  Outcome Evaluation: Pt motivated to work on mobility, but progress slow.  t/f bed to bsc, min-A x1, antalgic, FWW, pt too fatigued to t/f to chair after bsc, thus chair had to be switched with bsc.  continued recommendation IPR

## 2023-08-13 NOTE — PROGRESS NOTES
"IM progress note      Luna Mark  [id number]  1950     LOS: 4 days     Attending: Darío Pickering MD    Primary Care Provider: Provider, No Known      Chief Complaint/Reason for visit:  back pain    Subjective   Feels good. Pain well controled. No new complaints. Denies f/c/n/v/sob/cp.     Objective     Vital Signs  Visit Vitals  /53 (BP Location: Left arm, Patient Position: Sitting)   Pulse 79   Temp 98.3 øF (36.8 øC) (Oral)   Resp 18   Ht 162.6 cm (64\")   Wt 96.5 kg (212 lb 11.9 oz)   SpO2 94%   BMI 36.52 kg/mý     Temp (24hrs), Av.3 øF (36.8 øC), Min:98 øF (36.7 øC), Max:99.1 øF (37.3 øC)      Nutrition: P.o.    Respiratory: RA    Physical Therapy: Pt motivated to work on mobility, but progress slow. t/f bed to bsc, min-A x1, antalgic, FWW, pt too fatigued to t/f to chair after bsc, thus chair had to be switched with bsc. continued recommendation IPR        Physical Exam:     General Appearance:    Alert, cooperative, in no acute distress   Head:    Normocephalic, without obvious abnormality, atraumatic    Lungs:     Normal effort, symmetric chest rise, no crepitus, clear to      auscultation bilaterally             Heart:    Regular rhythm and normal rate, normal S1 and S2   Abdomen:     Normal bowel sounds, no masses, no organomegaly, soft        non-tender, non-distended, no guarding, no rebound                tenderness  Back : Surgical dressing CDI.   Extremities:   No clubbing, cyanosis or edema.  No deformities.    Pulses:   Pulses palpable and equal bilaterally   Skin:   No bleeding, bruising or rash   Neurologic:   Moves all extremities with no obvious focal motor deficit.  Cranial nerves 2 - 12 grossly intact     Results Review:     I reviewed the patient's new clinical results.   Results from last 7 days   Lab Units 23  0742 23  0641 23  0333 08/10/23  1300 08/10/23  0540   WBC 10*3/mm3  --  7.69 10.12  --  8.40   HEMOGLOBIN g/dL 7.4* 6.7* 7.4*   < > 7.6* "   HEMATOCRIT % 23.1* 21.8* 24.1*   < > 24.2*   PLATELETS 10*3/mm3  --  153 156  --  141    < > = values in this interval not displayed.       Results from last 7 days   Lab Units 08/12/23  0641 08/11/23  0333 08/10/23  0540   SODIUM mmol/L 141 142 139   POTASSIUM mmol/L 4.1 5.2 4.3   CHLORIDE mmol/L 111* 112* 110*   CO2 mmol/L 23.0 22.0 22.0   BUN mg/dL 17 24* 18   CREATININE mg/dL 0.97 1.36* 1.06*   CALCIUM mg/dL 8.2* 7.9* 7.8*   GLUCOSE mg/dL 78 111* 190*        Latest Reference Range & Units 08/13/23 07:31 08/13/23 11:43   Glucose 70 - 130 mg/dL 98 101       I reviewed the patient's new imaging including images and reports.    All medications reviewed.   carvedilol, 12.5 mg, Oral, BID With Meals  famotidine, 20 mg, Intravenous, Q12H   Or  famotidine, 20 mg, Oral, Q12H  hydrOXYzine, 25 mg, Oral, 4x Daily  insulin detemir, 10 Units, Subcutaneous, Nightly  insulin lispro, 2-7 Units, Subcutaneous, 4x Daily AC & at Bedtime  sodium chloride, 3 mL, Intravenous, Q12H        Assessment & Plan     S/P lumbar fusion    Back pain    Hypertension    Diabetes    Acute blood loss anemia, 1 unti PRBC 8/12/23    Acute postoperative pain      Plan  1. PT/OT-ambulate  2. Pain control-prns       3. IS-encourage  4. DVT proph- [facility]  5. Bowel regimen  6. Monitor post-op labs  7. DC planning for rehab. CM following     HTN  - Continue home coreg  - Monitor BP   - Holding parameters for BP meds  - Labetalol PRN for SBP>170     DM  - hgb A1c in AM  - Hold OHA for now  - Accu-Chek AC and HS with low dose SSI  - will add levemir 10 units tonight for better glucose control    Acute blood loss anemia- responded well 8/13/23  - transfuse 1 unit PRBC 8/12/23      Uma Shields, ROBYN  8/13/23 @ 6478

## 2023-08-13 NOTE — THERAPY TREATMENT NOTE
Patient Name: Luna Mark  : 1950    MRN: [id number]                              Today's Date: 2023       Admit Date: 2023    Visit Dx: No diagnosis found.  Patient Active Problem List   Diagnosis    Back pain    S/P lumbar fusion    Hypertension    Diabetes    Acute blood loss anemia, 1 unti PRBC 23    Acute postoperative pain     Past Medical History:   Diagnosis Date    Diabetes mellitus     H/O skin graft     Hypertension     UTI (urinary tract infection)      Past Surgical History:   Procedure Laterality Date    COLONOSCOPY      LUMBAR DISCECTOMY FUSION INSTRUMENTATION N/A 2023    Procedure: LAMINECTOMY SPINAL FUSION L3-4, L4-5;  Surgeon: Darío Pickering MD;  Location: [facility];  Service: Orthopedic Spine;  Laterality: N/A;    SKIN GRAFT      BILATERAL HANDS AND ABDOMEN      General Information       Row Name 23 0944          Physical Therapy Time and Intention    Document Type therapy note (daily note)  -KG     Mode of Treatment individual therapy;physical therapy  -KG       Row Name 2344          General Information    Patient Profile Reviewed yes  -KG     Existing Precautions/Restrictions fall;spinal;orthostatic hypotension  -KG       Row Name 2344          Cognition    Orientation Status (Cognition) oriented x 4  -KG       Row Name 2344          Safety Issues, Functional Mobility    Impairments Affecting Function (Mobility) balance;endurance/activity tolerance;pain;postural/trunk control;strength  -KG               User Key  (r) = Recorded By, (t) = Taken By, (c) = Cosigned By      Initials Name Provider Type    KG Katja Sawyer Physical Therapist                   Mobility       Row Name 23 0946          Bed Mobility    Bed Mobility supine-sit  -KG     Supine-Sit [place] (Bed Mobility) moderate assist (50% patient effort)  -KG     Assistive Device (Bed Mobility) bed rails  -KG       Row Name 2350          Transfers     Comment, (Transfers) t/f bed to bsc, min-A x1, antalgic, FWW, pt too fatigued to t/f to chair after bsc, thus chair had to be switched with bsc  -KG       Row Name 08/13/23 0946          Bed-Chair Transfer    Bed-Chair [place] (Transfers) minimum assist (75% patient effort);1 person assist;verbal cues  -KG     Assistive Device (Bed-Chair Transfers) walker, front-wheeled  -KG       Row Name 08/13/23 0946          Sit-Stand Transfer    Sit-Stand [place] (Transfers) minimum assist (75% patient effort);verbal cues;1 person assist  -KG     Assistive Device (Sit-Stand Transfers) walker, front-wheeled  -KG       Row Name 08/13/23 0946          Gait/Stairs (Locomotion)    [place] Level (Gait) unable to assess  -KG               User Key  (r) = Recorded By, (t) = Taken By, (c) = Cosigned By      Initials Name Provider Type    Katja Shay Physical Therapist                   Obj/Interventions       Row Name 08/13/23 0947          Motor Skills    Therapeutic Exercise other (see comments)  heel slides, ankle pumps, glut sets x15  -KG       Row Name 08/13/23 0947          Balance    Dynamic Standing Balance minimal assist;1-person assist  -KG     Position/Device Used, Standing Balance supported;walker, front-wheeled  -KG               User Key  (r) = Recorded By, (t) = Taken By, (c) = Cosigned By      Initials Name Provider Type    Katja Shay Physical Therapist                   Goals/Plan    No documentation.                  Clinical Impression       Row Name 08/13/23 0983          Pain    Pretreatment Pain Rating 8/10  -KG     Posttreatment Pain Rating 8/10  -KG     Pain Location - Side/Orientation Bilateral  -KG     Pain Location lower  -KG     Pain Location - back  -KG     Pre/Posttreatment Pain Comment RN aware  -KG     Pain Intervention(s) Ambulation/increased activity;Repositioned  -KG       Row Name 08/13/23 0921          Plan of Care Review    Plan of Care Reviewed With patient  -KG      Progress improving  -KG     Outcome Evaluation Pt motivated to work on mobility, but progress slow.  t/f bed to bsc, min-A x1, antalgic, FWW, pt too fatigued to t/f to chair after bsc, thus chair had to be switched with bsc.  continued recommendation IPR  -KG       Row Name 08/13/23 0951          Vital Signs    Pre Systolic BP Rehab 139  -KG     Pre Treatment Diastolic BP 64  -KG     Intra Systolic BP Rehab 132  -KG     Intra Treatment Diastolic BP 49  -KG       Row Name 08/13/23 0951          Positioning and Restraints    Pre-Treatment Position in bed  -KG     Post Treatment Position chair  -KG     In Chair notified nsg;call light within reach;encouraged to call for assist;exit alarm on;legs elevated  -KG               User Key  (r) = Recorded By, (t) = Taken By, (c) = Cosigned By      Initials Name Provider Type    Katja Shay Physical Therapist                   Outcome Measures       Row Name 08/13/23 0953 08/13/23 0808       How much help from another person do you currently need...    Turning from your back to your side while in flat bed without using bedrails? 2  -KG 2  -GJ    Moving from lying on back to sitting on the side of a flat bed without bedrails? 2  -KG 2  -GJ    Moving to and from a bed to a chair (including a wheelchair)? 3  -KG 2  -GJ    Standing up from a chair using your arms (e.g., wheelchair, bedside chair)? 3  -KG 3  -GJ    Climbing 3-5 steps with a railing? 1  -KG 2  -GJ    To walk in hospital room? 2  -KG 2  -GJ    AM-PAC 6 Clicks Score (PT) 13  -KG 13  -GJ    Highest level of mobility 4 --> Transferred to chair/commode  -KG 4 --> Transferred to chair/commode  -GJ      Row Name 08/13/23 0953          Functional Assessment    Outcome Measure Options AM-PAC 6 Clicks Basic Mobility (PT)  -KG               User Key  (r) = Recorded By, (t) = Taken By, (c) = Cosigned By      Initials Name Provider Type    Ladonna Archuleta RN Registered Nurse    Katja Shay Physical  Therapist                                 Physical Therapy Education       Title: PT OT SLP Therapies (In Progress)       Topic: Physical Therapy (Done)       Point: Mobility training (Done)       Learning Progress Summary             Patient Acceptance, E, VU by KG at 8/13/2023 0953    Acceptance, E, VU by KG at 8/12/2023 1129    Acceptance, E, VU by CM at 8/11/2023 1337    Acceptance, E, VU by CM at 8/10/2023 0908                         Point: Home exercise program (Done)       Learning Progress Summary             Patient Acceptance, E, VU by KG at 8/13/2023 0953    Acceptance, E, VU by KG at 8/12/2023 1129    Acceptance, E, VU by CM at 8/11/2023 1337    Acceptance, E, VU by CM at 8/10/2023 0908                         Point: Body mechanics (Done)       Learning Progress Summary             Patient Acceptance, E, VU by KG at 8/13/2023 0953    Acceptance, E, VU by KG at 8/12/2023 1129    Acceptance, E, VU by CM at 8/11/2023 1337    Acceptance, E, VU by CM at 8/10/2023 0908                         Point: Precautions (Done)       Learning Progress Summary             Patient Acceptance, E, VU by KG at 8/13/2023 0953    Acceptance, E, VU by KG at 8/12/2023 1129    Acceptance, E, VU by CM at 8/11/2023 1337    Acceptance, E, VU by CM at 8/10/2023 0908                                         User Key       Initials Effective Dates Name Provider Type Discipline    KG 01/04/23 -  Katja Sawyer Physical Therapist PT    CM 09/22/22 -  Shwetha Ferro PT Physical Therapist PT                  PT Recommendation and Plan     Plan of Care Reviewed With: patient  Progress: improving  Outcome Evaluation: Pt motivated to work on mobility, but progress slow.  t/f bed to bsc, min-A x1, antalgic, FWW, pt too fatigued to t/f to chair after bsc, thus chair had to be switched with bsc.  continued recommendation IPR     Time Calculation:         PT Charges       Row Name 08/13/23 0954             Time Calculation    Start Time  0914  -KG      PT Received On 08/13/23  -KG         Timed Charges    02627 - PT Therapeutic Exercise Minutes 10  -KG      68914 - PT Therapeutic Activity Minutes 15  -KG         Total Minutes    Timed Charges Total Minutes 25  -KG       Total Minutes 25  -KG                User Key  (r) = Recorded By, (t) = Taken By, (c) = Cosigned By      Initials Name Provider Type    Katja Shay Physical Therapist                  Therapy Charges for Today       Code Description Service Date Service Provider Modifiers Qty    06746144941 HC PT THER PROC EA 15 MIN 8/12/2023 Katja Sawyer GP 1    57192908300 HC PT THERAPEUTIC ACT EA 15 MIN 8/12/2023 Katja Sawyer GP 1    03466751051 HC PT THER PROC EA 15 MIN 8/13/2023 Katja Sawyer GP 1    24456167570 HC PT THERAPEUTIC ACT EA 15 MIN 8/13/2023 Katja Sawyer GP 1            PT G-Codes  Outcome Measure Options: AM-PAC 6 Clicks Basic Mobility (PT)  AM-PAC 6 Clicks Score (PT): 13  AM-PAC 6 Clicks Score (OT): 15       Katja Sawyer  8/13/2023

## 2023-08-13 NOTE — PLAN OF CARE
Problem: Adult Inpatient Plan of Care  Goal: Plan of Care Review  Outcome: Ongoing, Progressing  Flowsheets  Taken 8/13/2023 1739 by Ladonna Chamberlain RN  Progress: improving  Taken 8/13/2023 0951 by Katja Sawyer  Plan of Care Reviewed With: patient  Goal: Patient-Specific Goal (Individualized)  Outcome: Ongoing, Progressing  Goal: Absence of Hospital-Acquired Illness or Injury  Outcome: Ongoing, Progressing  Intervention: Identify and Manage Fall Risk  Recent Flowsheet Documentation  Taken 8/13/2023 1600 by Ladonna Chamberlain RN  Safety Promotion/Fall Prevention:   activity supervised   assistive device/personal items within reach   clutter free environment maintained   room organization consistent   safety round/check completed   toileting scheduled  Taken 8/13/2023 1400 by Ladonna Chamberlain RN  Safety Promotion/Fall Prevention:   activity supervised   assistive device/personal items within reach   clutter free environment maintained   room organization consistent   safety round/check completed   toileting scheduled  Taken 8/13/2023 1200 by Ladonna Chamberlain RN  Safety Promotion/Fall Prevention:   activity supervised   assistive device/personal items within reach   clutter free environment maintained   room organization consistent   safety round/check completed   toileting scheduled  Taken 8/13/2023 1000 by Ladonna Chamberlain RN  Safety Promotion/Fall Prevention:   activity supervised   assistive device/personal items within reach   clutter free environment maintained   room organization consistent   safety round/check completed   toileting scheduled  Taken 8/13/2023 0808 by Ladonna Chamberlain RN  Safety Promotion/Fall Prevention:   activity supervised   assistive device/personal items within reach   clutter free environment maintained   room organization consistent   safety round/check completed   toileting scheduled  Intervention: Prevent Skin Injury  Recent Flowsheet Documentation  Taken 8/13/2023 1600 by  Ladonna Chamberlain RN  Body Position: left  Taken 8/13/2023 1400 by Ladonna Chamberlain RN  Body Position: legs elevated  Taken 8/13/2023 1200 by Ladonna Chamberlain RN  Body Position: legs elevated  Taken 8/13/2023 1000 by Ladonna Chamberlain RN  Body Position: legs elevated  Taken 8/13/2023 0808 by Ladonna Chamberlain RN  Body Position: position changed independently  Intervention: Prevent and Manage VTE (Venous Thromboembolism) Risk  Recent Flowsheet Documentation  Taken 8/13/2023 1600 by Ladonna Chamberlain RN  Activity Management: activity encouraged  VTE Prevention/Management:   bilateral   sequential compression devices on  Taken 8/13/2023 1400 by Ladonna Chamberlain RN  Activity Management: up in chair  VTE Prevention/Management:   bilateral   sequential compression devices on  Taken 8/13/2023 1200 by Ladonna Chamberlain RN  Activity Management: up in chair  VTE Prevention/Management:   bilateral   sequential compression devices on  Taken 8/13/2023 1000 by Ladonna Chamberlain RN  Activity Management: up in chair  VTE Prevention/Management:   bilateral   sequential compression devices on  Taken 8/13/2023 0808 by Ladonna Chamberlain RN  Activity Management: activity encouraged  VTE Prevention/Management:   bilateral   sequential compression devices on  Intervention: Prevent Infection  Recent Flowsheet Documentation  Taken 8/13/2023 1600 by Ladonna Chamberlain RN  Infection Prevention:   environmental surveillance performed   rest/sleep promoted  Taken 8/13/2023 1400 by Ladonna Chamberlain RN  Infection Prevention:   environmental surveillance performed   rest/sleep promoted  Taken 8/13/2023 1200 by Ladonna Chamberlain RN  Infection Prevention:   environmental surveillance performed   rest/sleep promoted  Taken 8/13/2023 1000 by Ladonna Chamberlain RN  Infection Prevention:   environmental surveillance performed   rest/sleep promoted  Taken 8/13/2023 0808 by Ladonna Chamberlain RN  Infection Prevention:   environmental surveillance  performed   rest/sleep promoted  Goal: Optimal Comfort and Wellbeing  Outcome: Ongoing, Progressing  Intervention: Provide Person-Centered Care  Recent Flowsheet Documentation  Taken 8/13/2023 1200 by Ladonna Chamberlain RN  Trust Relationship/Rapport: care explained  Taken 8/13/2023 0808 by Ladonna Chamberlain RN  Trust Relationship/Rapport: care explained  Goal: Readiness for Transition of Care  Outcome: Ongoing, Progressing     Problem: Diabetes Comorbidity  Goal: Blood Glucose Level Within Targeted Range  Outcome: Ongoing, Progressing     Problem: Pain Chronic (Persistent) (Comorbidity Management)  Goal: Acceptable Pain Control and Functional Ability  Outcome: Ongoing, Progressing  Intervention: Manage Persistent Pain  Recent Flowsheet Documentation  Taken 8/13/2023 1600 by Ladonna Chamberlain RN  Medication Review/Management: medications reviewed  Taken 8/13/2023 1400 by Ladonna Chamberlain RN  Medication Review/Management: medications reviewed  Taken 8/13/2023 1200 by Ladonna Chamberlain RN  Medication Review/Management: medications reviewed  Taken 8/13/2023 1000 by Ladonna Chamberlain RN  Medication Review/Management: medications reviewed  Taken 8/13/2023 0808 by Ladonna Chamberlain RN  Medication Review/Management: medications reviewed     Problem: Bleeding (Spinal Surgery)  Goal: Absence of Bleeding  Outcome: Ongoing, Progressing     Problem: Bowel Motility Impaired (Spinal Surgery)  Goal: Effective Bowel Elimination  Outcome: Ongoing, Progressing     Problem: Fluid and Electrolyte Imbalance (Spinal Surgery)  Goal: Fluid and Electrolyte Balance  Outcome: Ongoing, Progressing     Problem: Functional Ability Impaired (Spinal Surgery)  Goal: Optimal Functional Ability  Outcome: Ongoing, Progressing  Intervention: Optimize Functional Status  Recent Flowsheet Documentation  Taken 8/13/2023 1600 by Ladonna Chamberlain RN  Activity Management: activity encouraged  Positioning/Transfer Devices:   pillows   in use  Taken 8/13/2023  1400 by Ladonna Chamberlain RN  Activity Management: up in chair  Positioning/Transfer Devices:   pillows   in use  Taken 8/13/2023 1200 by Ladonna Chamberlain RN  Activity Management: up in chair  Positioning/Transfer Devices:   pillows   in use  Taken 8/13/2023 1000 by Ladonna Chamberlain RN  Activity Management: up in chair  Positioning/Transfer Devices:   pillows   in use  Taken 8/13/2023 0808 by Ladonna Chamberlain RN  Activity Management: activity encouraged  Positioning/Transfer Devices:   pillows   in use     Problem: Infection (Spinal Surgery)  Goal: Absence of Infection Signs and Symptoms  Outcome: Ongoing, Progressing  Intervention: Prevent or Manage Infection  Recent Flowsheet Documentation  Taken 8/13/2023 1600 by Ladonna Chamberlain RN  Infection Prevention:   environmental surveillance performed   rest/sleep promoted  Taken 8/13/2023 1400 by Ladonna Chamberlain RN  Infection Prevention:   environmental surveillance performed   rest/sleep promoted  Taken 8/13/2023 1200 by Ladonna Chamberlain RN  Infection Prevention:   environmental surveillance performed   rest/sleep promoted  Taken 8/13/2023 1000 by Ladonna Chamberlain RN  Infection Prevention:   environmental surveillance performed   rest/sleep promoted  Taken 8/13/2023 0808 by Ladonna Chamberlain RN  Infection Prevention:   environmental surveillance performed   rest/sleep promoted     Problem: Neurologic Impairment (Spinal Surgery)  Goal: Optimal Neurologic Function  Outcome: Ongoing, Progressing  Intervention: Optimize Neurologic Function  Recent Flowsheet Documentation  Taken 8/13/2023 1600 by Ladonna Chamberlain RN  Body Position: left  Taken 8/13/2023 1400 by Ladonna Chamberlain RN  Body Position: legs elevated  Taken 8/13/2023 1200 by Ladonna Chamberlain RN  Body Position: legs elevated  Taken 8/13/2023 1000 by Ladonna Chamberlain RN  Body Position: legs elevated  Taken 8/13/2023 0808 by Ladonna Chamberlain RN  Body Position: position changed independently     Problem:  Ongoing Anesthesia Effects (Spinal Surgery)  Goal: Anesthesia/Sedation Recovery  Outcome: Ongoing, Progressing  Intervention: Optimize Anesthesia Recovery  Recent Flowsheet Documentation  Taken 8/13/2023 1600 by Ladonna Chamberlain RN  Safety Promotion/Fall Prevention:   activity supervised   assistive device/personal items within reach   clutter free environment maintained   room organization consistent   safety round/check completed   toileting scheduled  Taken 8/13/2023 1400 by Ladonna Chamberlain RN  Safety Promotion/Fall Prevention:   activity supervised   assistive device/personal items within reach   clutter free environment maintained   room organization consistent   safety round/check completed   toileting scheduled  Taken 8/13/2023 1200 by Ladonna Chamberlain RN  Safety Promotion/Fall Prevention:   activity supervised   assistive device/personal items within reach   clutter free environment maintained   room organization consistent   safety round/check completed   toileting scheduled  Administration (IS): self-administered  Taken 8/13/2023 1000 by Ladonna Chamberlain RN  Safety Promotion/Fall Prevention:   activity supervised   assistive device/personal items within reach   clutter free environment maintained   room organization consistent   safety round/check completed   toileting scheduled  Administration (IS): self-administered  Taken 8/13/2023 0808 by Ladonna Chamberlain RN  Safety Promotion/Fall Prevention:   activity supervised   assistive device/personal items within reach   clutter free environment maintained   room organization consistent   safety round/check completed   toileting scheduled  Administration (IS):   instruction provided, follow-up   self-administered     Problem: Pain (Spinal Surgery)  Goal: Acceptable Pain Control  Outcome: Ongoing, Progressing     Problem: Postoperative Nausea and Vomiting (Spinal Surgery)  Goal: Nausea and Vomiting Relief  Outcome: Ongoing, Progressing     Problem:  Postoperative Urinary Retention (Spinal Surgery)  Goal: Effective Urinary Elimination  Outcome: Ongoing, Progressing     Problem: Respiratory Compromise (Spinal Surgery)  Goal: Effective Oxygenation and Ventilation  Outcome: Ongoing, Progressing  Intervention: Optimize Oxygenation and Ventilation  Recent Flowsheet Documentation  Taken 8/13/2023 1600 by Ladonna Chamberlain RN  Head of Bed (HOB) Positioning: HOB at 20-30 degrees  Taken 8/13/2023 0808 by Ladonna Chamberlain RN  Head of Bed (HOB) Positioning: HOB at 20-30 degrees     Problem: Fall Injury Risk  Goal: Absence of Fall and Fall-Related Injury  Outcome: Ongoing, Progressing  Intervention: Identify and Manage Contributors  Recent Flowsheet Documentation  Taken 8/13/2023 1600 by Ladonna Chamberlain RN  Medication Review/Management: medications reviewed  Taken 8/13/2023 1400 by Ladonna Chamberlain RN  Medication Review/Management: medications reviewed  Taken 8/13/2023 1200 by Ladonna Chamberlain RN  Medication Review/Management: medications reviewed  Taken 8/13/2023 1000 by Ladonna Chamberlain RN  Medication Review/Management: medications reviewed  Taken 8/13/2023 0808 by Ladonna Chamberlain RN  Medication Review/Management: medications reviewed  Intervention: Promote Injury-Free Environment  Recent Flowsheet Documentation  Taken 8/13/2023 1600 by Ladonna Chamberlain RN  Safety Promotion/Fall Prevention:   activity supervised   assistive device/personal items within reach   clutter free environment maintained   room organization consistent   safety round/check completed   toileting scheduled  Taken 8/13/2023 1400 by Ladonna Chamberlain RN  Safety Promotion/Fall Prevention:   activity supervised   assistive device/personal items within reach   clutter free environment maintained   room organization consistent   safety round/check completed   toileting scheduled  Taken 8/13/2023 1200 by Ladonna Chamberlain RN  Safety Promotion/Fall Prevention:   activity supervised   assistive  device/personal items within reach   clutter free environment maintained   room organization consistent   safety round/check completed   toileting scheduled  Taken 8/13/2023 1000 by Ladonna Chamberlain, RN  Safety Promotion/Fall Prevention:   activity supervised   assistive device/personal items within reach   clutter free environment maintained   room organization consistent   safety round/check completed   toileting scheduled  Taken 8/13/2023 0808 by Ladonna Chamberlain, RN  Safety Promotion/Fall Prevention:   activity supervised   assistive device/personal items within reach   clutter free environment maintained   room organization consistent   safety round/check completed   toileting scheduled   Goal Outcome Evaluation:           Progress: improving     Pt alert and oriented x4. VSS. RA. Bordering dressing CDI. Pt worked with physical therapy and sat up in the chair. Pain controlled with PRN PO pain medication. Pt has not had BM since 8/9, PRN colace and dulcolax tablet administered. Pt did not want suppository at this time. Plan to d/c to [facility] [place] when ready

## 2023-08-14 VITALS
HEART RATE: 86 BPM | WEIGHT: 212.74 LBS | HEIGHT: 64 IN | DIASTOLIC BLOOD PRESSURE: 64 MMHG | TEMPERATURE: 98.6 F | OXYGEN SATURATION: 95 % | RESPIRATION RATE: 18 BRPM | SYSTOLIC BLOOD PRESSURE: 144 MMHG | BODY MASS INDEX: 36.32 KG/M2

## 2023-08-14 LAB
GLUCOSE BLDC GLUCOMTR-MCNC: 150 MG/DL (ref 70–130)
GLUCOSE BLDC GLUCOMTR-MCNC: 69 MG/DL (ref 70–130)

## 2023-08-14 PROCEDURE — 97110 THERAPEUTIC EXERCISES: CPT

## 2023-08-14 PROCEDURE — 82948 REAGENT STRIP/BLOOD GLUCOSE: CPT

## 2023-08-14 PROCEDURE — 97116 GAIT TRAINING THERAPY: CPT

## 2023-08-14 RX ORDER — OXYCODONE AND ACETAMINOPHEN 10; 325 MG/1; MG/1
1 TABLET ORAL EVERY 4 HOURS PRN
Refills: 0
Start: 2023-08-14 | End: 2023-08-19

## 2023-08-14 RX ORDER — DOCUSATE SODIUM 100 MG/1
100 CAPSULE, LIQUID FILLED ORAL 2 TIMES DAILY
Start: 2023-08-14

## 2023-08-14 RX ORDER — HYDROXYZINE HYDROCHLORIDE 25 MG/1
25 TABLET, FILM COATED ORAL EVERY 8 HOURS PRN
Start: 2023-08-14

## 2023-08-14 RX ORDER — POLYETHYLENE GLYCOL 3350 17 G/17G
17 POWDER, FOR SOLUTION ORAL DAILY
Qty: 15 PACKET | Refills: 0
Start: 2023-08-14 | End: 2023-08-29

## 2023-08-14 RX ORDER — POLYETHYLENE GLYCOL 3350 17 G/17G
17 POWDER, FOR SOLUTION ORAL DAILY
Status: DISCONTINUED | OUTPATIENT
Start: 2023-08-14 | End: 2023-08-14 | Stop reason: HOSPADM

## 2023-08-14 RX ORDER — BISACODYL 10 MG
10 SUPPOSITORY, RECTAL RECTAL DAILY PRN
Start: 2023-08-14

## 2023-08-14 RX ADMIN — OXYCODONE HYDROCHLORIDE AND ACETAMINOPHEN 1 TABLET: 10; 325 TABLET ORAL at 14:55

## 2023-08-14 RX ADMIN — DOCUSATE SODIUM 100 MG: 100 CAPSULE, LIQUID FILLED ORAL at 09:31

## 2023-08-14 RX ADMIN — CARVEDILOL 12.5 MG: 12.5 TABLET, FILM COATED ORAL at 09:31

## 2023-08-14 RX ADMIN — Medication 3 ML: at 09:32

## 2023-08-14 RX ADMIN — BISACODYL 5 MG: 5 TABLET, COATED ORAL at 09:31

## 2023-08-14 RX ADMIN — OXYCODONE HYDROCHLORIDE AND ACETAMINOPHEN 1 TABLET: 10; 325 TABLET ORAL at 09:31

## 2023-08-14 RX ADMIN — POLYETHYLENE GLYCOL 3350 17 G: 17 POWDER, FOR SOLUTION ORAL at 12:18

## 2023-08-14 RX ADMIN — FAMOTIDINE 20 MG: 20 TABLET ORAL at 09:31

## 2023-08-14 RX ADMIN — ACETAMINOPHEN 650 MG: 325 TABLET ORAL at 09:31

## 2023-08-14 RX ADMIN — BISACODYL 10 MG: 10 SUPPOSITORY RECTAL at 12:54

## 2023-08-14 NOTE — DISCHARGE PLACEMENT REQUEST
"Case Management  [phone]    Luna Mark (72 y.o. Female)       Date of Birth   1950    Social Security Number       Address   [address] [place] [id number]    Home Phone   [phone]    MRN   [id number]       Caodaism   Unknown    Marital Status   Single                            Admission Date   23    Admission Type   Elective    Admitting Provider   Darío Pickering MD    Attending Provider   Darío Pickering MD    Department, Room/Bed   [address], S[address]/1       Discharge Date       Discharge Disposition   Rehab Facility or Unit (DC - External)    Discharge Destination                                 Attending Provider: Darío Pickering MD    Allergies: No Known Allergies    Isolation: None   Infection: None   Code Status: CPR    Ht: 162.6 cm (64\")   Wt: 96.5 kg (212 lb 11.9 oz)    Admission Cmt: None   Principal Problem: S/P lumbar fusion [Z98.1]                   Active Insurance as of 2023       Primary Coverage       Payor Plan Insurance Group Employer/Plan Group    HUMANA MEDICARE REPLACEMENT HUMANA MEDICARE REPLACEMENT [id number]       Payor Plan Address Payor Plan Phone Number Payor Plan Fax Number Effective Dates    [address] [address] [phone]  2023 - None Entered    [facility] [address]         Subscriber Name Subscriber Birth Date Member ID       LUNA MARK 1950 [id number]                     Emergency Contacts        (Rel.) Home Phone Work Phone Mobile Phone    ANNE MARK (Son) [phone] -- [phone]    Lesvia Connor (Friend) [phone] -- [phone]                 Discharge Summary        Rafaela Parsons MD at 23 1300          Patient Name: Luna Mark  MRN: [id number]  : 1950  DOS: 2023    Attending: Darío Pickering MD    Primary Care Provider: Provider, No Known    Date of Admission:.2023  5:29 AM    Date of Discharge:  2023    Discharge " Diagnosis:   S/P lumbar fusion    Back pain    Hypertension    Diabetes    Acute blood loss anemia, 1 unti PRBC 8/12/23    Acute postoperative pain      Hospital Course    At Admit:    Patient is a pleasant 72 y.o. female presented for scheduled surgery by Dr. Pickering.  She underwent lumbar fusion under general anesthesia.  She tolerated surgery well and was admitted for further medical management.  She is to have back pain for over 3 years.  The pain radiates down bilateral lower extremities.  She denies saddle anesthesia.     When seen in PACU she is doing well.  Her pain is well-controlled.  She denies nausea, shortness of breath or chest pain.  No history of DVT or PE.    After admit:    Patient was provided pain medications as needed for pain control.    Adjustments were made to pain medications to optimize postop pain management.   Risks and benefits of opiate medications discussed with patient.    Phil report in chart was reviewed prior to discharge.    She was seen by PT and has progressed slowly over her stay.  Inpatient rehab was recommended.    During her stay she used an IS for atelectasis prophylaxis and mechanicals for DVT prophylaxis.  Home medications were resumed as appropriate, and labs were monitored, she required 1 unit of packed red blood cell transfusion, her anemia responded well.    Her blood glucose was monitored on a regimen of subcutaneous insulin in the hospital, p.o. regimen will be resumed at time of discharge.    She was placed on a bowel regimen due to constipation, will ensure results prior to discharge.      With the progress she has made, Ms. Mark is ready for DC home today.    Discussed with patient regarding plan and she shows understanding and agreement.           Procedures Performed      PREOPERATIVE DIAGNOSES:   L3-L4 and L4-L5 lumbar spinal stenosis.   Degenerative spondylolisthesis.      POSTOPERATIVE DIAGNOSES:  L3-L4 and L4-L5 lumbar spinal stenosis.   Degenerative  "spondylolisthesis.      PROCEDURES PERFORMED:   Decompressive laminectomy, medial facetectomy, and foraminotomy L3-L4 and L4-L5 to decompress neural elements.   Segmental instrumentation L3-L4-L5 with DePuy transpedicular fixation.   L3-L4 and L4-L5 transforaminal lumbar interbody fusion with interbody fusion cage and bone graft.   Posterolateral fusion L3-L4-L5 with bone graft.   Harvesting bone graft locally from spinous processes and lamina.   Use of ViviGen allograft.      SURGEON: Darío Pickering MD              Pertinent Test Results:    I reviewed the patient's new clinical results.   Results from last 7 days   Lab Units 08/13/23  0742 08/12/23  0641 08/11/23  0333 08/10/23  1300 08/10/23  0540   WBC 10*3/mm3  --  7.69 10.12  --  8.40   HEMOGLOBIN g/dL 7.4* 6.7* 7.4*   < > 7.6*   HEMATOCRIT % 23.1* 21.8* 24.1*   < > 24.2*   PLATELETS 10*3/mm3  --  153 156  --  141    < > = values in this interval not displayed.     Results from last 7 days   Lab Units 08/12/23  0641 08/11/23  0333 08/10/23  0540   SODIUM mmol/L 141 142 139   POTASSIUM mmol/L 4.1 5.2 4.3   CHLORIDE mmol/L 111* 112* 110*   CO2 mmol/L 23.0 22.0 22.0   BUN mg/dL 17 24* 18   CREATININE mg/dL 0.97 1.36* 1.06*   CALCIUM mg/dL 8.2* 7.9* 7.8*   GLUCOSE mg/dL 78 111* 190*     I reviewed the patient's new imaging including images and reports.      Physical therapy:       Goal Outcome Evaluation:  Plan of Care Reviewed With: patient  Progress: improving  Outcome Evaluation: Pt increased distance amb to 50' with FWW and CGAx2. VC for upright posture. Activity limited by elevated pain. Continue to recommend d/c to IRF to promote increased independence with functional mobility.        Anticipated Discharge Disposition (PT): inpatient rehabilitation facility                   Discharge Assessment:       Visit Vitals  /64 (BP Location: Left arm, Patient Position: Sitting)   Pulse 86   Temp 98.6 øF (37 øC) (Oral)   Resp 18   Ht 162.6 cm (64\")   Wt 96.5 kg " (212 lb 11.9 oz)   SpO2 95%   BMI 36.52 kg/mý     Temp (24hrs), Av.4 øF (36.9 øC), Min:97.8 øF (36.6 øC), Max:98.9 øF (37.2 øC)      General Appearance:    Alert, cooperative, in no acute distress   Lungs:     Clear to auscultation,respirations regular, even and unlabored    Heart:    Regular rhythm and normal rate, normal S1 and S2    Abdomen:     Normal bowel sounds, no masses, no organomegaly, soft  non-tender, non-distended, no guarding, no rebound tenderness   Extremities:   Moves all extremities well, no edema, no cyanosis, no redness     Pulses:   Pulses palpable and equal bilaterally   Skin:   No bleeding, bruising or rash. Back dressing CDI.   Neurologic:   Cranial nerves 2 - 12 grossly intact, sensation intact, no gross deficit. Flexion and dorsiflexion intact bilateral feet.       Discharge Disposition: [facility]          Discharge Medications        New Medications        Instructions Start Date   bisacodyl 10 MG suppository  Commonly known as: DULCOLAX   10 mg, Rectal, Daily PRN      docusate sodium 100 MG capsule  Commonly known as: Colace   100 mg, Oral, 2 Times Daily      oxyCODONE-acetaminophen  MG per tablet  Commonly known as: PERCOCET   1 tablet, Oral, Every 4 Hours PRN      polyethylene glycol 17 g packet  Commonly known as: MIRALAX   17 g, Oral, Daily             Changes to Medications        Instructions Start Date   hydrOXYzine 25 MG tablet  Commonly known as: ATARAX  What changed:   when to take this  reasons to take this   25 mg, Oral, Every 8 Hours PRN             Continue These Medications        Instructions Start Date   carvedilol 25 MG tablet  Commonly known as: COREG   25 mg, Oral, 2 Times Daily With Meals      ezetimibe 10 MG tablet  Commonly known as: ZETIA   1 tablet, Oral, Daily      gabapentin 300 MG capsule  Commonly known as: NEURONTIN   2 capsules, Oral, 3 Times Daily      glipizide 10 MG 24 hr tablet  Commonly known as: GLUCOTROL XL   1 tablet, Oral, 2 Times  Daily      lisinopril 40 MG tablet  Commonly known as: PRINIVIL,ZESTRIL   Take 1 tablet twice a day by oral route.      magnesium oxide 400 MG tablet  Commonly known as: MAG-OX   400 mg, Oral, 2 Times Daily      metFORMIN 500 MG tablet  Commonly known as: GLUCOPHAGE   TAKE 1 TABLETS TWICE DAILY WITH MEALS - NEED MD APPOINTMENT FOR REFILLS      pioglitazone 45 MG tablet  Commonly known as: ACTOS   1 tablet, Oral, Every Morning      vitamin D3 125 MCG (5000 UT) capsule capsule   5,000 Units, Oral, Daily               Discharge Diet: Resume prior, diabetic diet.      Activity at Discharge: Ambulate.      Follow-up Appointments  4 weeks with Dr. Darío Pickering, sooner if needed    Discharge took over 30 min       Rafaela Parsons MD  08/14/23  13:12 EDT      Electronically signed by Rafaela Parsons MD at 08/14/23 5229

## 2023-08-14 NOTE — PROGRESS NOTES
"IM progress note      Luna Mark  [id number]  1950     LOS: 5 days     Attending: Darío Pickering MD    Primary Care Provider: Provider, No Known      Chief Complaint/Reason for visit:  back pain    Subjective   Feels good. Pain well controled. No new complaints. Denies f/c/n/v/sob/cp.     Objective        Visit Vitals  /77 (BP Location: Left arm, Patient Position: Lying)   Pulse 86   Temp 97.8 øF (36.6 øC) (Oral)   Resp 18   Ht 162.6 cm (64\")   Wt 96.5 kg (212 lb 11.9 oz)   SpO2 99%   BMI 36.52 kg/mý     Temp (24hrs), Av.3 øF (36.8 øC), Min:97.8 øF (36.6 øC), Max:98.9 øF (37.2 øC)      Nutrition: P.o.    Respiratory: RA    Physical Therapy:      Goal Outcome Evaluation:  Plan of Care Reviewed With: patient  Progress: improving  Outcome Evaluation: Pt increased distance amb to 50' with FWW and CGAx2. VC for upright posture. Activity limited by elevated pain. Continue to recommend d/c to IRF to promote increased independence with functional mobility.        Anticipated Discharge Disposition (PT): inpatient rehabilitation facility          Physical Exam:     General Appearance:    Alert, cooperative, in no acute distress   Head:    Normocephalic, without obvious abnormality, atraumatic    Lungs:     Normal effort, symmetric chest rise, no crepitus, clear to      auscultation bilaterally             Heart:    Regular rhythm and normal rate, normal S1 and S2   Abdomen:     Normal bowel sounds, no masses, no organomegaly, soft        non-tender, non-distended, no guarding, no rebound                tenderness  Back : Surgical dressing CDI.   Extremities:   No clubbing, cyanosis or edema.  No deformities.    Pulses:   Pulses palpable and equal bilaterally   Skin:   No bleeding, bruising or rash   Neurologic:   Moves all extremities with no obvious focal motor deficit.  Cranial nerves 2 - 12 grossly intact     Results Review:     I reviewed the patient's new clinical results.   Results from last  " days   Lab Units 08/13/23  0742 08/12/23  0641 08/11/23  0333 08/10/23  1300 08/10/23  0540   WBC 10*3/mm3  --  7.69 10.12  --  8.40   HEMOGLOBIN g/dL 7.4* 6.7* 7.4*   < > 7.6*   HEMATOCRIT % 23.1* 21.8* 24.1*   < > 24.2*   PLATELETS 10*3/mm3  --  153 156  --  141    < > = values in this interval not displayed.       Results from last 7 days   Lab Units 08/12/23  0641 08/11/23  0333 08/10/23  0540   SODIUM mmol/L 141 142 139   POTASSIUM mmol/L 4.1 5.2 4.3   CHLORIDE mmol/L 111* 112* 110*   CO2 mmol/L 23.0 22.0 22.0   BUN mg/dL 17 24* 18   CREATININE mg/dL 0.97 1.36* 1.06*   CALCIUM mg/dL 8.2* 7.9* 7.8*   GLUCOSE mg/dL 78 111* 190*        Latest Reference Range & Units 08/13/23 07:31 08/13/23 11:43   Glucose 70 - 130 mg/dL 98 101      Latest Reference Range & Units 08/13/23 17:12 08/13/23 21:04 08/14/23 07:35 08/14/23 11:47   Glucose 70 - 130 mg/dL 137 (H) 119 69 (L) 150 (H)   (H): Data is abnormally high  (L): Data is abnormally low  I reviewed the patient's new imaging including images and reports.    All medications reviewed.   carvedilol, 12.5 mg, Oral, BID With Meals  famotidine, 20 mg, Oral, Q12H  hydrOXYzine, 25 mg, Oral, 4x Daily  insulin detemir, 10 Units, Subcutaneous, Nightly  insulin lispro, 2-7 Units, Subcutaneous, 4x Daily AC & at Bedtime  polyethylene glycol, 17 g, Oral, Daily  sodium chloride, 3 mL, Intravenous, Q12H        Assessment & Plan     S/P lumbar fusion    Back pain    Hypertension    Diabetes    Acute blood loss anemia, 1 unti PRBC 8/12/23    Acute postoperative pain      Plan  1. PT/OT-ambulate  2. Pain control-prns       3. IS-encourage  4. DVT proph- Mechs  5. Bowel regimen  6. Monitor post-op labs  7. DC planning for rehab. CM following     HTN  - Continue home coreg  - Monitor BP   - Holding parameters for BP meds  - Labetalol PRN for SBP>170     DM  - A1C 7.2.  - Hold OHA for now  - Accu-Chek AC and HS with low dose SSI  - Levemir nightly.    Acute blood loss anemia- responded well  8/13/23  - transfused 1 unit PRBC 8/12/23          139.9

## 2023-08-14 NOTE — PLAN OF CARE
Problem: Adult Inpatient Plan of Care  Goal: Plan of Care Review  Outcome: Met  Flowsheets (Taken 8/14/2023 1441)  Progress: improving  Plan of Care Reviewed With: patient  Goal: Patient-Specific Goal (Individualized)  Outcome: Met  Goal: Absence of Hospital-Acquired Illness or Injury  Outcome: Met  Intervention: Identify and Manage Fall Risk  Recent Flowsheet Documentation  Taken 8/14/2023 1414 by Claudia Silva, RN  Safety Promotion/Fall Prevention:   safety round/check completed   toileting scheduled   room organization consistent   nonskid shoes/slippers when out of bed   gait belt   lighting adjusted   elopement precautions   fall prevention program maintained   clutter free environment maintained   assistive device/personal items within reach   activity supervised   mobility aid in reach  Taken 8/14/2023 1254 by Claudia Silva, RN  Safety Promotion/Fall Prevention:   safety round/check completed   toileting scheduled   room organization consistent   muscle strengthening facilitated   nonskid shoes/slippers when out of bed   mobility aid in reach   lighting adjusted   gait belt   elopement precautions   fall prevention program maintained   clutter free environment maintained   assistive device/personal items within reach   activity supervised  Taken 8/14/2023 1001 by Claudia Silva, RN  Safety Promotion/Fall Prevention:   safety round/check completed   toileting scheduled   room organization consistent   nonskid shoes/slippers when out of bed   mobility aid in reach   lighting adjusted   fall prevention program maintained   elopement precautions   clutter free environment maintained   assistive device/personal items within reach   activity supervised   gait belt  Taken 8/14/2023 0931 by Claudia Silva, RN  Safety Promotion/Fall Prevention:   safety round/check completed   toileting scheduled   room organization consistent   nonskid shoes/slippers when out of bed   mobility aid in reach   lighting adjusted    gait belt   fall prevention program maintained   clutter free environment maintained   assistive device/personal items within reach   activity supervised   elopement precautions  Intervention: Prevent Skin Injury  Recent Flowsheet Documentation  Taken 8/14/2023 1414 by Claudia Silva RN  Body Position: lower extremity elevated  Taken 8/14/2023 1254 by Claudia Silva RN  Body Position: lower extremity elevated  Taken 8/14/2023 1001 by Claudia Silva RN  Body Position: neutral body alignment  Taken 8/14/2023 0931 by Claudia Silva RN  Body Position: supine  Intervention: Prevent and Manage VTE (Venous Thromboembolism) Risk  Recent Flowsheet Documentation  Taken 8/14/2023 1414 by Claudia Silva RN  Activity Management: up in chair  Taken 8/14/2023 1254 by Claudia Silva RN  Activity Management: up in chair  VTE Prevention/Management:   bilateral   sequential compression devices on  Taken 8/14/2023 0931 by Claudia Silva RN  VTE Prevention/Management:   bilateral   sequential compression devices on  Range of Motion: ROM (range of motion) performed  Goal: Optimal Comfort and Wellbeing  Outcome: Met  Intervention: Monitor Pain and Promote Comfort  Recent Flowsheet Documentation  Taken 8/14/2023 0931 by Claudia Silva RN  Pain Management Interventions:   pain management plan reviewed with patient/caregiver   see MAR   position adjusted   care clustered  Intervention: Provide Person-Centered Care  Recent Flowsheet Documentation  Taken 8/14/2023 0931 by Claudia Silva RN  Trust Relationship/Rapport:   choices provided   care explained  Goal: Readiness for Transition of Care  Outcome: Met     Problem: Diabetes Comorbidity  Goal: Blood Glucose Level Within Targeted Range  Outcome: Met     Problem: Pain Chronic (Persistent) (Comorbidity Management)  Goal: Acceptable Pain Control and Functional Ability  Outcome: Met  Intervention: Develop Pain Management Plan  Recent Flowsheet Documentation  Taken 8/14/2023 0931 by  Ricardo, Claudia R, RN  Pain Management Interventions:   pain management plan reviewed with patient/caregiver   see MAR   position adjusted   care clustered     Problem: Bleeding (Spinal Surgery)  Goal: Absence of Bleeding  Outcome: Met     Problem: Bowel Motility Impaired (Spinal Surgery)  Goal: Effective Bowel Elimination  Outcome: Met     Problem: Fluid and Electrolyte Imbalance (Spinal Surgery)  Goal: Fluid and Electrolyte Balance  Outcome: Met     Problem: Functional Ability Impaired (Spinal Surgery)  Goal: Optimal Functional Ability  Outcome: Met  Intervention: Optimize Functional Status  Recent Flowsheet Documentation  Taken 8/14/2023 1414 by Claudia Silva RN  Activity Management: up in chair  Positioning/Transfer Devices: pillows  Taken 8/14/2023 1254 by Claudia Silva RN  Activity Management: up in chair  Positioning/Transfer Devices: pillows  Taken 8/14/2023 1001 by Claudia Silva RN  Positioning/Transfer Devices: pillows  Taken 8/14/2023 0931 by Claudia Silva RN  Positioning/Transfer Devices: pillows     Problem: Infection (Spinal Surgery)  Goal: Absence of Infection Signs and Symptoms  Outcome: Met     Problem: Neurologic Impairment (Spinal Surgery)  Goal: Optimal Neurologic Function  Outcome: Met  Intervention: Optimize Neurologic Function  Recent Flowsheet Documentation  Taken 8/14/2023 1414 by Claudia Silva RN  Body Position: lower extremity elevated  Taken 8/14/2023 1254 by Claudia Silva RN  Body Position: lower extremity elevated  Taken 8/14/2023 1001 by Claudia Silva RN  Body Position: neutral body alignment  Taken 8/14/2023 0931 by Claudia Silva RN  Body Position: supine  Range of Motion: ROM (range of motion) performed     Problem: Ongoing Anesthesia Effects (Spinal Surgery)  Goal: Anesthesia/Sedation Recovery  Outcome: Met  Intervention: Optimize Anesthesia Recovery  Recent Flowsheet Documentation  Taken 8/14/2023 1414 by Claudia Silva RN  Safety Promotion/Fall Prevention:   safety  round/check completed   toileting scheduled   room organization consistent   nonskid shoes/slippers when out of bed   gait belt   lighting adjusted   elopement precautions   fall prevention program maintained   clutter free environment maintained   assistive device/personal items within reach   activity supervised   mobility aid in reach  Taken 8/14/2023 1254 by Claudia Silva, RN  Safety Promotion/Fall Prevention:   safety round/check completed   toileting scheduled   room organization consistent   muscle strengthening facilitated   nonskid shoes/slippers when out of bed   mobility aid in reach   lighting adjusted   gait belt   elopement precautions   fall prevention program maintained   clutter free environment maintained   assistive device/personal items within reach   activity supervised  Taken 8/14/2023 1001 by Claudia iSlva, RN  Safety Promotion/Fall Prevention:   safety round/check completed   toileting scheduled   room organization consistent   nonskid shoes/slippers when out of bed   mobility aid in reach   lighting adjusted   fall prevention program maintained   elopement precautions   clutter free environment maintained   assistive device/personal items within reach   activity supervised   gait belt  Taken 8/14/2023 0931 by Claudia Silva, RN  Patient Tolerance (IS): no adverse signs/symptoms present  Safety Promotion/Fall Prevention:   safety round/check completed   toileting scheduled   room organization consistent   nonskid shoes/slippers when out of bed   mobility aid in reach   lighting adjusted   gait belt   fall prevention program maintained   clutter free environment maintained   assistive device/personal items within reach   activity supervised   elopement precautions  Administration (IS): self-administered     Problem: Pain (Spinal Surgery)  Goal: Acceptable Pain Control  Outcome: Met  Intervention: Prevent or Manage Pain  Recent Flowsheet Documentation  Taken 8/14/2023 0931 by Claudia Silva  RN  Pain Management Interventions:   pain management plan reviewed with patient/caregiver   see MAR   position adjusted   care clustered     Problem: Postoperative Nausea and Vomiting (Spinal Surgery)  Goal: Nausea and Vomiting Relief  Outcome: Met     Problem: Postoperative Urinary Retention (Spinal Surgery)  Goal: Effective Urinary Elimination  Outcome: Met     Problem: Respiratory Compromise (Spinal Surgery)  Goal: Effective Oxygenation and Ventilation  Outcome: Met  Intervention: Optimize Oxygenation and Ventilation  Recent Flowsheet Documentation  Taken 8/14/2023 1001 by Claudia Silva RN  Head of Bed (HOB) Positioning: HOB at 20-30 degrees  Taken 8/14/2023 0931 by Claudia Silva RN  Head of Bed (HOB) Positioning: HOB lowered     Problem: Fall Injury Risk  Goal: Absence of Fall and Fall-Related Injury  Outcome: Met  Intervention: Promote Injury-Free Environment  Recent Flowsheet Documentation  Taken 8/14/2023 1414 by Claudia Silva, RN  Safety Promotion/Fall Prevention:   safety round/check completed   toileting scheduled   room organization consistent   nonskid shoes/slippers when out of bed   gait belt   lighting adjusted   elopement precautions   fall prevention program maintained   clutter free environment maintained   assistive device/personal items within reach   activity supervised   mobility aid in reach  Taken 8/14/2023 1254 by Claudia Silva, RN  Safety Promotion/Fall Prevention:   safety round/check completed   toileting scheduled   room organization consistent   muscle strengthening facilitated   nonskid shoes/slippers when out of bed   mobility aid in reach   lighting adjusted   gait belt   elopement precautions   fall prevention program maintained   clutter free environment maintained   assistive device/personal items within reach   activity supervised  Taken 8/14/2023 1001 by Claudia Silva RN  Safety Promotion/Fall Prevention:   safety round/check completed   toileting scheduled   room  organization consistent   nonskid shoes/slippers when out of bed   mobility aid in reach   lighting adjusted   fall prevention program maintained   elopement precautions   clutter free environment maintained   assistive device/personal items within reach   activity supervised   gait belt  Taken 8/14/2023 0931 by Claudia Silva RN  Safety Promotion/Fall Prevention:   safety round/check completed   toileting scheduled   room organization consistent   nonskid shoes/slippers when out of bed   mobility aid in reach   lighting adjusted   gait belt   fall prevention program maintained   clutter free environment maintained   assistive device/personal items within reach   activity supervised   elopement precautions   Goal Outcome Evaluation:

## 2023-08-14 NOTE — PROGRESS NOTES
"Ortho Spine Progress Note     LOS: 5 days   Patient Care Team:  Provider, No Known as PCP - Anai Cleveland DO (Internal Medicine)    Subjective: She complains of back being sore.  Apparent slow progress with physical therapy.  She says she can only walk short distances.  She lives alone.    Objective:   Vital Signs:  /69 (BP Location: Left arm, Patient Position: Lying)   Pulse 79   Temp 98.9 øF (37.2 øC) (Oral)   Resp 18   Ht 162.6 cm (64\")   Wt 96.5 kg (212 lb 11.9 oz)   SpO2 100%   BMI 36.52 kg/mý     Labs:  Lab Results (last 24 hours)       Procedure Component Value Units Date/Time    POC Glucose Once [[id number]]  (Abnormal) Collected: 08/14/23 0735    Specimen: Blood Updated: 08/14/23 0737     Glucose 69 mg/dL     POC Glucose Once [[id number]]  (Normal) Collected: 08/13/23 2104    Specimen: Blood Updated: 08/13/23 2105     Glucose 119 mg/dL     POC Glucose Once [[id number]]  (Abnormal) Collected: 08/13/23 1712    Specimen: Blood Updated: 08/13/23 1714     Glucose 137 mg/dL     POC Glucose Once [[id number]]  (Normal) Collected: 08/13/23 1143    Specimen: Blood Updated: 08/13/23 1148     Glucose 101 mg/dL     Hemoglobin & Hematocrit, Blood [[id number]]  (Abnormal) Collected: 08/13/23 0742    Specimen: Blood Updated: 08/13/23 0914     Hemoglobin 7.4 g/dL      Hematocrit 23.1 %             Sitting at bedside.  Motor intact.    Assessment/Plan: Rehab placement previously requested.  Still pending.  Medically stable from my standpoint to go to rehab if available.  Otherwise continue medical management per Dr. BENITEZ and physical therapy/mobilization.    Darío Pickering MD  08/14/23  08:46 EDT    "

## 2023-08-14 NOTE — PLAN OF CARE
Goal Outcome Evaluation:  Plan of Care Reviewed With: patient A&OX4 VSS Pain controlled with PO meds. Up with walker to [facility]. No BM since 8/9 BS hypoactive, pt refuses suppository. PO fluids and ambulation encouraged. Transfer to [facility] when medically ready.

## 2023-08-14 NOTE — CASE MANAGEMENT/SOCIAL WORK
Case Management Discharge Note      Final Note: Ms. Mark will be discharged today.  Per April with [facility], they have received insurance approval for a skilled inpatient rehab stay and a bed is available today.  CM will fax the discharge summary when available to [phone].  Nurse to please call report to [phone].  I have arranged [employer] transportation for 4pm this afternoon.  I have discussed these arrangements with Ms. Mark and she remains agreeable to inpatient rehab at [facility] as well as wheelchair transportation with [facility]. She denies having any other discharge needs at this time.         Selected Continued Care - Admitted Since 8/9/2023       Destination Coordination complete.      Service Provider Selected Services Address Phone Fax Patient Preferred    [facility] [address] [address] [phone] --              Durable Medical Equipment    No services have been selected for the patient.                Dialysis/Infusion    No services have been selected for the patient.                Home Medical Care    No services have been selected for the patient.                Therapy    No services have been selected for the patient.                Community Resources    No services have been selected for the patient.                Community & DME    No services have been selected for the patient.                         Final Discharge Disposition Code: 03 - skilled nursing facility (SNF)

## 2023-08-14 NOTE — PLAN OF CARE
Goal Outcome Evaluation:  Plan of Care Reviewed With: patient        Progress: improving  Outcome Evaluation: Pt increased distance amb to 50' with FWW and CGAx2. VC for upright posture. Activity limited by elevated pain. Continue to recommend d/c to IRF to promote increased independence with functional mobility.      Anticipated Discharge Disposition (PT): inpatient rehabilitation facility

## 2023-08-14 NOTE — THERAPY TREATMENT NOTE
Patient Name: Luna Mark  : 1950    MRN: [id number]                              Today's Date: 2023       Admit Date: 2023    Visit Dx: No diagnosis found.  Patient Active Problem List   Diagnosis    Back pain    S/P lumbar fusion    Hypertension    Diabetes    Acute blood loss anemia, 1 unti PRBC 23    Acute postoperative pain     Past Medical History:   Diagnosis Date    Diabetes mellitus     H/O skin graft     Hypertension     UTI (urinary tract infection)      Past Surgical History:   Procedure Laterality Date    COLONOSCOPY      LUMBAR DISCECTOMY FUSION INSTRUMENTATION N/A 2023    Procedure: LAMINECTOMY SPINAL FUSION L3-4, L4-5;  Surgeon: Darío Pickering MD;  Location: [facility];  Service: Orthopedic Spine;  Laterality: N/A;    SKIN GRAFT      BILATERAL HANDS AND ABDOMEN      General Information       Row Name 23 1050          Physical Therapy Time and Intention    Document Type therapy note (daily note)  -     Mode of Treatment physical therapy  -       Row Name 23 1050          General Information    Patient Profile Reviewed yes  -     Existing Precautions/Restrictions fall;spinal;orthostatic hypotension  -       Row Name 23 1050          Cognition    Orientation Status (Cognition) oriented x 4  -       Row Name 23 1050          Safety Issues, Functional Mobility    Impairments Affecting Function (Mobility) balance;endurance/activity tolerance;pain;postural/trunk control;strength  -               User Key  (r) = Recorded By, (t) = Taken By, (c) = Cosigned By      Initials Name Provider Type     Nichole Perez PT Physical Therapist                   Mobility       Row Name 23 1050          Bed Mobility    Bed Mobility supine-sit  -     Supine-Sit [place] (Bed Mobility) minimum assist (75% patient effort)  -     Assistive Device (Bed Mobility) bed rails  -     Comment, (Bed Mobility) VC for sequencing with good ability to  recall. Increased time and effort to complete task secondary to pain. Mild dizziness reported with transition that resolved.  -       Row Name 08/14/23 1050          Transfers    Comment, (Transfers) Pt performed STS with Min A secondary elevated pain  -       Row Name 08/14/23 1050          Sit-Stand Transfer    Sit-Stand [place] (Transfers) minimum assist (75% patient effort);verbal cues;1 person assist  -     Assistive Device (Sit-Stand Transfers) walker, front-wheeled  -       Row Name 08/14/23 1050          Gait/Stairs (Locomotion)    [place] Level (Gait) contact guard  -     Assistive Device (Gait) walker, front-wheeled  -     Distance in Feet (Gait) 50  -     Deviations/Abnormal Patterns (Gait) bilateral deviations;tor decreased;gait speed decreased;stride length decreased  -     Bilateral Gait Deviations forward flexed posture;heel strike decreased  -     Comment, (Gait/Stairs) Pt amb in [place] with FWW and CGA. Pt demonstrated step-through gait pattern, wide JAIDEN, forward flexed posture, decreased gait speed and decreased stride length. VC for upright posture and increased stride length with fair improvement. Activity limited by elevated pain.  -               User Key  (r) = Recorded By, (t) = Taken By, (c) = Cosigned By      Initials Name Provider Type     Nichole Perez PT Physical Therapist                   Obj/Interventions       Row Name 08/14/23 1053          Motor Skills    Therapeutic Exercise hip;knee;ankle;shoulder  -       Row Name 08/14/23 1053          Shoulder (Therapeutic Exercise)    Shoulder (Therapeutic Exercise) AROM (active range of motion)  -     Shoulder AROM (Therapeutic Exercise) bilateral;flexion;10 repetitions;other (see comments)  with abdominal sets  -       Row Name 08/14/23 1053          Hip (Therapeutic Exercise)    Hip (Therapeutic Exercise) AROM (active range of motion)  -     Hip AROM (Therapeutic Exercise)  bilateral;aBduction;aDduction;external rotation;internal rotation;flexion;extension;10 repetitions  -[facility] Name 08/14/23 1053          Knee (Therapeutic Exercise)    Knee (Therapeutic Exercise) isometric exercises  -     Knee Isometrics (Therapeutic Exercise) bilateral;gluteal sets;quad sets;10 repetitions  -[facility] Name 08/14/23 1053          Ankle (Therapeutic Exercise)    Ankle (Therapeutic Exercise) AROM (active range of motion)  -     Ankle AROM (Therapeutic Exercise) bilateral;dorsiflexion;plantarflexion;10 repetitions  -[facility] Name 08/14/23 1053          Balance    Balance Assessment sitting static balance;sitting dynamic balance;sit to stand dynamic balance;standing static balance;standing dynamic balance  -     Static Sitting Balance standby assist  -     Dynamic Sitting Balance standby assist  -     Position, Sitting Balance sitting edge of bed  -     Static Standing Balance contact guard  -     Dynamic Standing Balance contact guard  -     Position/Device Used, Standing Balance supported;walker, rolling  -     Balance Interventions sitting;standing;sit to stand;occupation based/functional task  -               User Key  (r) = Recorded By, (t) = Taken By, (c) = Cosigned By      Initials Name Provider Type     Nichole Perez, PT Physical Therapist                   Goals/Plan       Row Name 08/14/23 1058          Bed Mobility Goal 1 (PT)    Activity/Assistive Device (Bed Mobility Goal 1, PT) sit to supine/supine to sit  -     [place] Level/Cues Needed (Bed Mobility Goal 1, PT) supervision required  -     Time Frame (Bed Mobility Goal 1, PT) long term goal (LTG);10 days  -     Progress/Outcomes (Bed Mobility Goal 1, PT) good progress toward goal  -HP       Row Name 08/14/23 1058          Transfer Goal 1 (PT)    Activity/Assistive Device (Transfer Goal 1, PT) sit-to-stand/stand-to-sit;bed-to-chair/chair-to-bed  -     [place] Level/Cues Needed (Transfer  Goal 1, PT) standby assist  -HP     Time Frame (Transfer Goal 1, PT) long term goal (LTG);10 days  -HP     Progress/Outcome (Transfer Goal 1, PT) good progress toward goal  -HP       Row Name 08/14/23 1058          Gait Training Goal 1 (PT)    Activity/Assistive Device (Gait Training Goal 1, PT) gait (walking locomotion);assistive device use  -HP     [place] Level (Gait Training Goal 1, PT) standby assist  -HP     Distance (Gait Training Goal 1, PT) 150  -HP     Time Frame (Gait Training Goal 1, PT) long term goal (LTG);10 days  -HP     Progress/Outcome (Gait Training Goal 1, PT) goal revised this date;progress slower than expected  -       Row Name 08/14/23 1058          Stairs Goal 1 (PT)    Progress/Outcome (Stairs Goal 1, PT) goal no longer appropriate  -               User Key  (r) = Recorded By, (t) = Taken By, (c) = Cosigned By      Initials Name Provider Type     Nichole Perez, PT Physical Therapist                   Clinical Impression       Row Name 08/14/23 1055          Pain    Pretreatment Pain Rating 3/10  -     Posttreatment Pain Rating 7/10  -     Pain Location - Side/Orientation Left  -     Pain Location lower  -     Pain Location - extremity;back  -     Pre/Posttreatment Pain Comment back pain and pain reported into L anterior thigh  -     Pain Intervention(s) Repositioned;Ambulation/increased activity;Elevated  -       Row Name 08/14/23 1056          Plan of Care Review    Plan of Care Reviewed With patient  -     Progress improving  -     Outcome Evaluation Pt increased distance amb to 50' with FWW and CGAx2. VC for upright posture. Activity limited by elevated pain. Continue to recommend d/c to IRF to promote increased independence with functional mobility.  -       Row Name 08/14/23 1052          Therapy Assessment/Plan (PT)    Rehab Potential (PT) good, to achieve stated therapy goals  -     Criteria for Skilled Interventions Met (PT) yes;meets  criteria;skilled treatment is necessary  -HP     Therapy Frequency (PT) daily  -HP       Row Name 08/14/23 1055          Vital Signs    Pre Systolic BP Rehab --  VSS  -HP     Pre Patient Position Supine  -HP     Intra Patient Position Standing  -HP     Post Patient Position Sitting  -HP       Row Name 08/14/23 1055          Positioning and Restraints    Pre-Treatment Position in bed  -HP     Post Treatment Position chair  -HP     In Chair notified nsg;reclined;sitting;call light within reach;encouraged to call for assist;exit alarm on;legs elevated;compression device;waffle cushion;on mechanical lift sling  -HP               User Key  (r) = Recorded By, (t) = Taken By, (c) = Cosigned By      Initials Name Provider Type     Nichole Perez PT Physical Therapist                   Outcome Measures       Row Name 08/14/23 1100          How much help from another person do you currently need...    Turning from your back to your side while in flat bed without using bedrails? 3  -HP     Moving from lying on back to sitting on the side of a flat bed without bedrails? 3  -HP     Moving to and from a bed to a chair (including a wheelchair)? 3  -HP     Standing up from a chair using your arms (e.g., wheelchair, bedside chair)? 3  -HP     Climbing 3-5 steps with a railing? 2  -HP     To walk in hospital room? 2  -HP     AM-PAC 6 Clicks Score (PT) 16  -HP     Highest level of mobility 5 --> Static standing  -HP       Row Name 08/14/23 1100          Functional Assessment    Outcome Measure Options AM-PAC 6 Clicks Basic Mobility (PT)  -HP               User Key  (r) = Recorded By, (t) = Taken By, (c) = Cosigned By      Initials Name Provider Type     Nichole Perez PT Physical Therapist                                 Physical Therapy Education       Title: PT OT SLP Therapies (In Progress)       Topic: Physical Therapy (Done)       Point: Mobility training (Done)       Learning Progress Summary             Patient  Acceptance, E,D, VU,DU by HP at 8/14/2023 1100    Acceptance, E, VU by KG at 8/13/2023 0953    Acceptance, E, VU by KG at 8/12/2023 1129    Acceptance, E, VU by CM at 8/11/2023 1337    Acceptance, E, VU by CM at 8/10/2023 0908                         Point: Home exercise program (Done)       Learning Progress Summary             Patient Acceptance, E,D, VU,DU by HP at 8/14/2023 1100    Acceptance, E, VU by KG at 8/13/2023 0953    Acceptance, E, VU by KG at 8/12/2023 1129    Acceptance, E, VU by CM at 8/11/2023 1337    Acceptance, E, VU by CM at 8/10/2023 0908                         Point: Body mechanics (Done)       Learning Progress Summary             Patient Acceptance, E,D, VU,DU by HP at 8/14/2023 1100    Acceptance, E, VU by KG at 8/13/2023 0953    Acceptance, E, VU by KG at 8/12/2023 1129    Acceptance, E, VU by CM at 8/11/2023 1337    Acceptance, E, VU by CM at 8/10/2023 0908                         Point: Precautions (Done)       Learning Progress Summary             Patient Acceptance, E,D, VU,DU by HP at 8/14/2023 1100    Acceptance, E, VU by KG at 8/13/2023 0953    Acceptance, E, VU by KG at 8/12/2023 1129    Acceptance, E, VU by CM at 8/11/2023 1337    Acceptance, E, VU by CM at 8/10/2023 0908                                         User Key       Initials Effective Dates Name Provider Type Discipline    KG 01/04/23 -  Katja Sawyer Physical Therapist PT     06/01/21 -  Nichole Perez, PT Physical Therapist PT    CM 09/22/22 -  Shwetha Ferro, GHADA Physical Therapist PT                  PT Recommendation and Plan     Plan of Care Reviewed With: patient  Progress: improving  Outcome Evaluation: Pt increased distance amb to 50' with FWW and CGAx2. VC for upright posture. Activity limited by elevated pain. Continue to recommend d/c to IRF to promote increased independence with functional mobility.     Time Calculation:         PT Charges       Row Name 08/14/23 1015             Time Calculation     Start Time 1015  -HP      PT Received On 08/14/23  -      PT Goal Re-Cert Due Date 08/24/23  -HP         Timed Charges    19725 - PT Therapeutic Exercise Minutes 8  -HP      67216 - Gait Training Minutes  15  -HP      79488 - PT Therapeutic Activity Minutes 5  -HP         Total Minutes    Timed Charges Total Minutes 28  -HP       Total Minutes 28  -HP                User Key  (r) = Recorded By, (t) = Taken By, (c) = Cosigned By      Initials Name Provider Type     Nichole Perez, PT Physical Therapist                  Therapy Charges for Today       Code Description Service Date Service Provider Modifiers Qty    99773104964 HC PT THER PROC EA 15 MIN 8/14/2023 Nichole Perez, PT GP 1    15243115791 HC GAIT TRAINING EA 15 MIN 8/14/2023 Nichole Perez, PT GP 1            PT G-Codes  Outcome Measure Options: AM-PAC 6 Clicks Basic Mobility (PT)  AM-PAC 6 Clicks Score (PT): 16  AM-PAC 6 Clicks Score (OT): 15  PT Discharge Summary  Anticipated Discharge Disposition (PT): inpatient rehabilitation facility    Nichole Perez PT  8/14/2023

## 2023-08-14 NOTE — DISCHARGE SUMMARY
Patient Name: Luna Mark  MRN: [id number]  : 1950  DOS: 2023    Attending: Darío Pickering MD    Primary Care Provider: Provider, No Known    Date of Admission:.2023  5:29 AM    Date of Discharge:  2023    Discharge Diagnosis:   S/P lumbar fusion    Back pain    Hypertension    Diabetes    Acute blood loss anemia, 1 unti PRBC 23    Acute postoperative pain      Hospital Course    At Admit:    Patient is a pleasant 72 y.o. female presented for scheduled surgery by Dr. Pickering.  She underwent lumbar fusion under general anesthesia.  She tolerated surgery well and was admitted for further medical management.  She is to have back pain for over 3 years.  The pain radiates down bilateral lower extremities.  She denies saddle anesthesia.     When seen in PACU she is doing well.  Her pain is well-controlled.  She denies nausea, shortness of breath or chest pain.  No history of DVT or PE.    After admit:    Patient was provided pain medications as needed for pain control.    Adjustments were made to pain medications to optimize postop pain management.   Risks and benefits of opiate medications discussed with patient.    Phil report in chart was reviewed prior to discharge.    She was seen by PT and has progressed slowly over her stay.  Inpatient rehab was recommended.    During her stay she used an IS for atelectasis prophylaxis and mechanicals for DVT prophylaxis.  Home medications were resumed as appropriate, and labs were monitored, she required 1 unit of packed red blood cell transfusion, her anemia responded well.    Her blood glucose was monitored on a regimen of subcutaneous insulin in the hospital, p.o. regimen will be resumed at time of discharge.    She was placed on a bowel regimen due to constipation, will ensure results prior to discharge.      With the progress she has made, Ms. Mark is ready for DC home today.    Discussed with patient regarding plan and she shows  understanding and agreement.           Procedures Performed      PREOPERATIVE DIAGNOSES:   L3-L4 and L4-L5 lumbar spinal stenosis.   Degenerative spondylolisthesis.      POSTOPERATIVE DIAGNOSES:  L3-L4 and L4-L5 lumbar spinal stenosis.   Degenerative spondylolisthesis.      PROCEDURES PERFORMED:   Decompressive laminectomy, medial facetectomy, and foraminotomy L3-L4 and L4-L5 to decompress neural elements.   Segmental instrumentation L3-L4-L5 with DePuy transpedicular fixation.   L3-L4 and L4-L5 transforaminal lumbar interbody fusion with interbody fusion cage and bone graft.   Posterolateral fusion L3-L4-L5 with bone graft.   Harvesting bone graft locally from spinous processes and lamina.   Use of ViviGen allograft.      SURGEON: Darío Pickering MD              Pertinent Test Results:    I reviewed the patient's new clinical results.   Results from last 7 days   Lab Units 08/13/23  0742 08/12/23  0641 08/11/23  0333 08/10/23  1300 08/10/23  0540   WBC 10*3/mm3  --  7.69 10.12  --  8.40   HEMOGLOBIN g/dL 7.4* 6.7* 7.4*   < > 7.6*   HEMATOCRIT % 23.1* 21.8* 24.1*   < > 24.2*   PLATELETS 10*3/mm3  --  153 156  --  141    < > = values in this interval not displayed.     Results from last 7 days   Lab Units 08/12/23  0641 08/11/23  0333 08/10/23  0540   SODIUM mmol/L 141 142 139   POTASSIUM mmol/L 4.1 5.2 4.3   CHLORIDE mmol/L 111* 112* 110*   CO2 mmol/L 23.0 22.0 22.0   BUN mg/dL 17 24* 18   CREATININE mg/dL 0.97 1.36* 1.06*   CALCIUM mg/dL 8.2* 7.9* 7.8*   GLUCOSE mg/dL 78 111* 190*     I reviewed the patient's new imaging including images and reports.      Physical therapy:       Goal Outcome Evaluation:  Plan of Care Reviewed With: patient  Progress: improving  Outcome Evaluation: Pt increased distance amb to 50' with FWW and CGAx2. VC for upright posture. Activity limited by elevated pain. Continue to recommend d/c to IRF to promote increased independence with functional mobility.        Anticipated Discharge  "Disposition (PT): inpatient rehabilitation facility                   Discharge Assessment:       Visit Vitals  /64 (BP Location: Left arm, Patient Position: Sitting)   Pulse 86   Temp 98.6 øF (37 øC) (Oral)   Resp 18   Ht 162.6 cm (64\")   Wt 96.5 kg (212 lb 11.9 oz)   SpO2 95%   BMI 36.52 kg/mý     Temp (24hrs), Av.4 øF (36.9 øC), Min:97.8 øF (36.6 øC), Max:98.9 øF (37.2 øC)      General Appearance:    Alert, cooperative, in no acute distress   Lungs:     Clear to auscultation,respirations regular, even and unlabored    Heart:    Regular rhythm and normal rate, normal S1 and S2    Abdomen:     Normal bowel sounds, no masses, no organomegaly, soft  non-tender, non-distended, no guarding, no rebound tenderness   Extremities:   Moves all extremities well, no edema, no cyanosis, no redness     Pulses:   Pulses palpable and equal bilaterally   Skin:   No bleeding, bruising or rash. Back dressing CDI.   Neurologic:   Cranial nerves 2 - 12 grossly intact, sensation intact, no gross deficit. Flexion and dorsiflexion intact bilateral feet.       Discharge Disposition: [facility]          Discharge Medications        New Medications        Instructions Start Date   bisacodyl 10 MG suppository  Commonly known as: DULCOLAX   10 mg, Rectal, Daily PRN      docusate sodium 100 MG capsule  Commonly known as: Colace   100 mg, Oral, 2 Times Daily      oxyCODONE-acetaminophen  MG per tablet  Commonly known as: PERCOCET   1 tablet, Oral, Every 4 Hours PRN      polyethylene glycol 17 g packet  Commonly known as: MIRALAX   17 g, Oral, Daily             Changes to Medications        Instructions Start Date   hydrOXYzine 25 MG tablet  Commonly known as: ATARAX  What changed:   when to take this  reasons to take this   25 mg, Oral, Every 8 Hours PRN             Continue These Medications        Instructions Start Date   carvedilol 25 MG tablet  Commonly known as: COREG   25 mg, Oral, 2 Times Daily With Meals    "   ezetimibe 10 MG tablet  Commonly known as: ZETIA   1 tablet, Oral, Daily      gabapentin 300 MG capsule  Commonly known as: NEURONTIN   2 capsules, Oral, 3 Times Daily      glipizide 10 MG 24 hr tablet  Commonly known as: GLUCOTROL XL   1 tablet, Oral, 2 Times Daily      lisinopril 40 MG tablet  Commonly known as: PRINIVIL,ZESTRIL   Take 1 tablet twice a day by oral route.      magnesium oxide 400 MG tablet  Commonly known as: MAG-OX   400 mg, Oral, 2 Times Daily      metFORMIN 500 MG tablet  Commonly known as: GLUCOPHAGE   TAKE 1 TABLETS TWICE DAILY WITH MEALS - NEED MD APPOINTMENT FOR REFILLS      pioglitazone 45 MG tablet  Commonly known as: ACTOS   1 tablet, Oral, Every Morning      vitamin D3 125 MCG (5000 UT) capsule capsule   5,000 Units, Oral, Daily               Discharge Diet: Resume prior, diabetic diet.      Activity at Discharge: Ambulate.      Follow-up Appointments  4 weeks with Dr. Darío Pickering, sooner if needed    Discharge took over 30 min       Rafaela Parsons MD  08/14/23  13:12 EDT

## 2023-08-16 NOTE — PROGRESS NOTES
"Enter Query Response Below      Query Response:   Patient was treated and monitored for acute kidney injury based off increase in creatinine > 0.3 mg/dl within 48 hours or less. Creatinine did return to normal limits.            If applicable, please update the problem list.     Patient: Luna Mark        : 1950  Account: [id number]           Admit Date: 2023        How to Respond to this query:       a. Click New Note     b. Answer query within the yellow box.                c. Update the Problem List, if applicable.      If you have any questions about this query contact me at: [email or web address]     Michell Jacques, APRN,    72-year-old female admitted with L3-L4 and L4-L5 lumbar spinal stenosis and degenerative spondylolisthesis that underwent lumbar fusion. Your progress note on  documents \"Currently patient with hypotension.  Will give saline bolus followed by saline infusion and will discontinue lisinopril due to worsening renal function.\" The patient's creatinine was 1.06 on 8/10 then 1.36 () and 0.97 () and hemoglobin was 10.6 on  then after surgery 7.6, 8.1, 7.4, 6.7 and 7.4. The patient was treated with 1750 ml NS bolus, blood transfusion, discontinue Lisinopril, decreased Coreg with hold parameters and monitoring labs.    Please clarify if the patient treated/monitored for the following:     - Acute kidney injury (DICK)  - Clinically insignificant lab finding only  - Other- specify: __________  - Unable to determine     DICK is defined by KDIGO as any of the following:    Increase in creatinine > 0.3 mg/dl within 48 hours or less; or    Increase in creatinine level to > 1.5x baseline (historical or measure), which is known or presumed to have occurred within the prior 7 days; or    Urine volume <0.5 ml/kg/h for 6 hours.  Reference: www. KIDGO.org    By submitting this query, we are merely seeking further clarification of documentation to accurately reflect all " conditions that you are monitoring, evaluating, treating or that extend the hospitalization or utilize additional resources of care. Please utilize your independent clinical judgment when addressing the question(s) above.     This query and your response, once completed, will be entered into the legal medical record.    Sincerely,  Cleveland Bean RN  Clinical Documentation Integrity Program

## (undated) DEVICE — DRP C/ARMOR

## (undated) DEVICE — SYR SLP TP 10ML DISP

## (undated) DEVICE — PENCL ROCKRSWCH MEGADYNE W/HOLSTR 10FT SS

## (undated) DEVICE — BLANKT WARM UPPR/BDY ARM/OUT 57X196CM

## (undated) DEVICE — STAPLER, SKIN, 35W, A: Brand: MEDLINE INDUSTRIES, INC.

## (undated) DEVICE — PK SPINE ORTHO 10

## (undated) DEVICE — PAD ARMBRD SURG CONVOL 7.5X20X2IN

## (undated) DEVICE — 3M™ MEDIPORE™ H SOFT CLOTH SURGICAL TAPE, 2863, 3 IN X 10 YD, 12/CASE: Brand: 3M™ MEDIPORE™

## (undated) DEVICE — PATIENT RETURN ELECTRODE, SINGLE-USE, CONTACT QUALITY MONITORING, ADULT, WITH 9FT CORD, FOR PATIENTS WEIGING OVER 33LBS. (15KG): Brand: MEGADYNE

## (undated) DEVICE — CUSA® CLARITY TORQUE WRENCH: Brand: CUSA® CLARITY

## (undated) DEVICE — CUSA® CLARITY QUICK CONNECT CARTRIDGE AND TUBING SET: Brand: CUSA® CLARITY

## (undated) DEVICE — CUSA CLARITY 23KHZ SINGLE-SIDED BONE TIP (FIVE PACK): Brand: CUSA® CLARITY

## (undated) DEVICE — TRAP FLD MINIVAC MEGADYNE 100ML

## (undated) DEVICE — 3.0MM PRECISION NEURO (MATCH HEAD)

## (undated) DEVICE — OIL CARTRIDGE: Brand: CORE, MAESTRO

## (undated) DEVICE — ADAPT LUER STUB INTRAMEDIC PE/200 17G

## (undated) DEVICE — GOWN,SLEEVE,STERILE,W/CSR WRAP,1/P: Brand: MEDLINE

## (undated) DEVICE — 450 ML BOTTLE OF 0.05% CHLORHEXIDINE GLUCONATE IN 99.95% STERILE WATER FOR IRRIGATION, USP AND APPLICATOR.: Brand: IRRISEPT ANTIMICROBIAL WOUND LAVAGE

## (undated) DEVICE — PROXIMATE RH ROTATING HEAD SKIN STAPLERS (35 WIDE) CONTAINS 35 STAINLESS STEEL STAPLES: Brand: PROXIMATE

## (undated) DEVICE — ANTIBACTERIAL UNDYED BRAIDED (POLYGLACTIN 910), SYNTHETIC ABSORBABLE SUTURE: Brand: COATED VICRYL

## (undated) DEVICE — KT DRN EVAC WND PVC PCH WTROC RND 10F400

## (undated) DEVICE — THE MILL DISPOSABLE - FINE

## (undated) DEVICE — PK ATS CUST W CARDIOTOMY RESEVOIR

## (undated) DEVICE — MEDI-VAC YANKAUER SUCTION HANDLE: Brand: CARDINAL HEALTH

## (undated) DEVICE — SNAP KOVER: Brand: UNBRANDED

## (undated) DEVICE — TB SXN FRAZIER 12F STRL

## (undated) DEVICE — CAUTERY TIP POLISHER: Brand: DEVON

## (undated) DEVICE — ELECTRD BLD EXT EDGE 1P COAT 4IN STRL

## (undated) DEVICE — GOWN,REINF,POLY,ECL,PP SLV,3XL,XLONG: Brand: MEDLINE

## (undated) DEVICE — INTENDED USE FOR SURGICAL MARKING ON INTACT SKIN, ALSO PROVIDES A PERMANENT METHOD OF IDENTIFYING OBJECTS IN THE OPERATING ROOM: Brand: WRITESITE® REGULAR TIP SKIN MARKER

## (undated) DEVICE — NEEDLE,HYPODERM,SAFETY, 22GX1.5: Brand: MEDLINE

## (undated) DEVICE — DIFFUSER: Brand: CORE, MAESTRO

## (undated) DEVICE — GAUZE,SPONGE,4"X4",16PLY,XRAY,STRL,LF: Brand: MEDLINE

## (undated) DEVICE — TBG PENCL TELESCP MEGADYNE SMOKE EVAC 10FT

## (undated) DEVICE — SPONGE,DISSECTOR,K,XRAY,9/16"X1/4",STRL: Brand: MEDLINE

## (undated) DEVICE — GLV SURG SENSICARE PI ORTHO SZ9 LF STRL

## (undated) DEVICE — ELECTRD BLD EZ CLN STD 4IN